# Patient Record
Sex: MALE | Race: BLACK OR AFRICAN AMERICAN | Employment: FULL TIME | URBAN - METROPOLITAN AREA
[De-identification: names, ages, dates, MRNs, and addresses within clinical notes are randomized per-mention and may not be internally consistent; named-entity substitution may affect disease eponyms.]

---

## 2017-03-18 ENCOUNTER — HOSPITAL ENCOUNTER (EMERGENCY)
Age: 27
Discharge: HOME | End: 2017-03-18
Attending: EMERGENCY MEDICINE
Payer: COMMERCIAL

## 2017-03-18 VITALS
BODY MASS INDEX: 28.12 KG/M2 | RESPIRATION RATE: 16 BRPM | TEMPERATURE: 98 F | OXYGEN SATURATION: 99 % | WEIGHT: 175 LBS | DIASTOLIC BLOOD PRESSURE: 66 MMHG | SYSTOLIC BLOOD PRESSURE: 149 MMHG | HEART RATE: 73 BPM | HEIGHT: 66 IN

## 2017-03-18 DIAGNOSIS — H57.13 EYE PAIN, BILATERAL: Primary | ICD-10-CM

## 2017-03-18 PROCEDURE — 99282 EMERGENCY DEPT VISIT SF MDM: CPT

## 2017-03-18 NOTE — DISCHARGE INSTRUCTIONS
Nico Easton is a 31 y.o. male that was discharged in stable condition. The patients diagnosis, condition and treatment were explained to  patient and aftercare instructions were given. The patient verbalized understanding. Patient armband removed and shredded.

## 2017-03-18 NOTE — ED PROVIDER NOTES
HPI Comments: 2:50 PM Nia Paula is a 31 y.o. male who presents to ED to be evaluated for HA onset one week ago . Pt describes it as \"sharp\" and \"shooting\" through his head. Pt c/o bilateral eye pain towards the back of his eyes. States it hurts to look to the side. Pt also c/o blurred vision and dizziness. Pt went to [facility] for the same symptoms and was prescribed hydrocodone with no relief. Pt is a  and welds pipes frequently. Denies any trauma or known eye infiltrates. No other concerns at this time. The history is provided by the patient. History reviewed. No pertinent past medical history. Past Surgical History:   Procedure Laterality Date    HX OTHER SURGICAL      tubes placed in ears         Family History:   Problem Relation Age of Onset    Diabetes Other     Hypertension Other        Social History     Social History    Marital status: SINGLE     Spouse name: N/A    Number of children: N/A    Years of education: N/A     Occupational History    Not on file. Social History Main Topics    Smoking status: Former Smoker     Types: Cigarettes     Quit date: 10/1/2011    Smokeless tobacco: Never Used    Alcohol use No    Drug use: No    Sexual activity: Not on file     Other Topics Concern    Not on file     Social History Narrative         ALLERGIES: Review of patient's allergies indicates no known allergies. Review of Systems   Constitutional: Negative for diaphoresis and fever. HENT: Negative for congestion. Eyes: Positive for pain (Bilateral ) and visual disturbance (blurred vision). Respiratory: Negative for cough and shortness of breath. Cardiovascular: Negative for chest pain. Gastrointestinal: Negative for abdominal pain and nausea. Genitourinary: Negative for dysuria and hematuria. Musculoskeletal: Negative for back pain. Skin: Negative for rash. Neurological: Positive for dizziness and headaches.    All other systems reviewed and are negative. Vitals:    03/18/17 1439   BP: 149/66   Pulse: 73   Resp: 16   Temp: 98 °F (36.7 °C)   SpO2: 99%   Weight: 79.4 kg (175 lb)   Height: 5' 6\" (1.676 m)            Physical Exam   Constitutional: He appears well-developed and well-nourished. Non-toxic appearance. He does not have a sickly appearance. He does not appear ill. No distress. HENT:   Head: Normocephalic and atraumatic. Mouth/Throat: Oropharynx is clear and moist. No oropharyngeal exudate. Eyes: Conjunctivae and EOM are normal. Pupils are equal, round, and reactive to light. No scleral icterus. Neck: Trachea normal and normal range of motion. Neck supple. No hepatojugular reflux and no JVD present. No tracheal deviation present. No thyromegaly present. Cardiovascular: Normal rate, regular rhythm, S1 normal, S2 normal, normal heart sounds, intact distal pulses and normal pulses. Exam reveals no gallop, no S3 and no S4. No murmur heard. Pulses:       Radial pulses are 2+ on the right side, and 2+ on the left side. Dorsalis pedis pulses are 2+ on the right side, and 2+ on the left side. Pulmonary/Chest: Effort normal and breath sounds normal. No accessory muscle usage. No respiratory distress. He has no decreased breath sounds. He has no wheezes. He has no rhonchi. He has no rales. Abdominal: Soft. Normal appearance and bowel sounds are normal. He exhibits no distension and no mass. There is no hepatosplenomegaly. There is no tenderness. There is no rigidity, no rebound, no guarding, no CVA tenderness, no tenderness at McBurney's point and negative Stroud's sign. Musculoskeletal: Normal range of motion. Strength 5/5 throughout    Lymphadenopathy:        Head (right side): No submental, no submandibular, no preauricular and no occipital adenopathy present. Head (left side): No submental, no submandibular, no preauricular and no occipital adenopathy present. He has no cervical adenopathy. Right: No supraclavicular adenopathy present. Left: No supraclavicular adenopathy present. Neurological: He is alert. He has normal strength and normal reflexes. He is not disoriented. No cranial nerve deficit or sensory deficit. Coordination and gait normal. GCS eye subscore is 4. GCS verbal subscore is 5. GCS motor subscore is 6. Grossly intact    Skin: Skin is warm, dry and intact. No rash noted. He is not diaphoretic. Psychiatric: He has a normal mood and affect. His speech is normal and behavior is normal. Judgment and thought content normal. Cognition and memory are normal.   Nursing note and vitals reviewed. MDM  Number of Diagnoses or Management Options  Eye pain, bilateral:     ED Course       Procedures      I have assessed the patient. Patient is feeling well and is advised to follow up with opthalmology. Patient was discharged in stable condition. Patient is to return to emergency department if any new or worsening condition. 3:20 PM    Disposition: Discharged     Diagnosis:   1. Eye pain, bilateral          Follow-up Information     Follow up With Details Comments Contact Info    Mary Caruso MD In 2 days Follow up [address] [place] [address] 44, am scribing for, and in the presence of Michael Hanna DO 2:51 PM, 03/18/17. Physician Attestation  I personally performed the services described in the documentation, reviewed the documentation, as recorded by the scribe in my presence, and it accurately and completely records my words and actions.     Michael Hanna DO

## 2017-04-01 ENCOUNTER — HOSPITAL ENCOUNTER (OUTPATIENT)
Dept: CT IMAGING | Age: 27
Discharge: HOME | End: 2017-04-01
Payer: COMMERCIAL

## 2017-04-01 DIAGNOSIS — G43.909 MIGRAINE: ICD-10-CM

## 2017-04-01 PROCEDURE — 70450 CT HEAD/BRAIN W/O DYE: CPT

## 2018-08-04 ENCOUNTER — HOSPITAL ENCOUNTER (EMERGENCY)
Age: 28
Discharge: HOME | End: 2018-08-04
Attending: EMERGENCY MEDICINE
Payer: COMMERCIAL

## 2018-08-04 ENCOUNTER — APPOINTMENT (OUTPATIENT)
Dept: GENERAL RADIOLOGY | Age: 28
End: 2018-08-04
Attending: PHYSICIAN ASSISTANT
Payer: COMMERCIAL

## 2018-08-04 VITALS
HEART RATE: 60 BPM | SYSTOLIC BLOOD PRESSURE: 105 MMHG | DIASTOLIC BLOOD PRESSURE: 52 MMHG | RESPIRATION RATE: 20 BRPM | TEMPERATURE: 98.6 F | OXYGEN SATURATION: 98 %

## 2018-08-04 DIAGNOSIS — K59.00 CONSTIPATION, UNSPECIFIED CONSTIPATION TYPE: ICD-10-CM

## 2018-08-04 DIAGNOSIS — J06.9 VIRAL URI WITH COUGH: Primary | ICD-10-CM

## 2018-08-04 PROCEDURE — 71046 X-RAY EXAM CHEST 2 VIEWS: CPT

## 2018-08-04 PROCEDURE — 99282 EMERGENCY DEPT VISIT SF MDM: CPT

## 2018-08-04 RX ORDER — POLYETHYLENE GLYCOL 3350 17 G/17G
17 POWDER, FOR SOLUTION ORAL DAILY
Qty: 119 G | Refills: 0 | Status: SHIPPED | OUTPATIENT
Start: 2018-08-04 | End: 2018-10-21

## 2018-08-04 NOTE — DISCHARGE INSTRUCTIONS
To prevent constipation, eat a diet high in fiber and drink lots of water. Take a daily stool softener such as Colace or Dulcolax. Narcotics and other pain medications can worsen the severity of the constipation, so avoid these medications if possible. Return to ED for any severe abdominal pain, blood in stool, profuse vomiting, or if no bowel movement after 24 hours of prescribed treatments. Drinking warm liquids, gargling with warm salt water, and throat lozenges may help with symptoms. An allergy medication combined with a decongestant (Allegra-D, Claritin-D, etc) should be taken daily. Saline nasal sprays or Net-Pots can be purchased over the counter to help reduce nasal congestion. Get rest and take a multivitamin daily to boost the immune system. Return to ED for any worsening or new symptoms such as throat swelling, high fevers, shortness of breath, vomiting, or if symptoms do not improve. Constipation: Care Instructions  Your Care Instructions    Constipation means that you have a hard time passing stools (bowel movements). People pass stools from 3 times a day to once every 3 days. What is normal for you may be different. Constipation may occur with pain in the rectum and cramping. The pain may get worse when you try to pass stools. Sometimes there are small amounts of bright red blood on toilet paper or the surface of stools. This is because of enlarged veins near the rectum (hemorrhoids). A few changes in your diet and lifestyle may help you avoid ongoing constipation. Your doctor may also prescribe medicine to help loosen your stool. Some medicines can cause constipation. These include pain medicines and antidepressants. Tell your doctor about all the medicines you take. Your doctor may want to make a medicine change to ease your symptoms. Follow-up care is a key part of your treatment and safety.  Be sure to make and go to all appointments, and call your doctor if you are having problems. It's also a good idea to know your test results and keep a list of the medicines you take. How can you care for yourself at home? · Drink plenty of fluids, enough so that your urine is light yellow or clear like water. If you have kidney, heart, or liver disease and have to limit fluids, talk with your doctor before you increase the amount of fluids you drink. · Include high-fiber foods in your diet each day. These include fruits, vegetables, beans, and whole grains. · Get at least 30 minutes of exercise on most days of the week. Walking is a good choice. You also may want to do other activities, such as running, swimming, cycling, or playing tennis or team sports. · Take a fiber supplement, such as Citrucel or Metamucil, every day. Read and follow all instructions on the label. · Schedule time each day for a bowel movement. A daily routine may help. Take your time having your bowel movement. · Support your feet with a small step stool when you sit on the toilet. This helps flex your hips and places your pelvis in a squatting position. · Your doctor may recommend an over-the-counter laxative to relieve your constipation. Examples are Milk of Magnesia and MiraLax. Read and follow all instructions on the label. Do not use laxatives on a long-term basis. When should you call for help? Call your doctor now or seek immediate medical care if:    · You have new or worse belly pain.     · You have new or worse nausea or vomiting.     · You have blood in your stools.    Watch closely for changes in your health, and be sure to contact your doctor if:    · Your constipation is getting worse.     · You do not get better as expected. Where can you learn more? Go to http://jay-isidoro.info/. Enter 21  in the search box to learn more about \"Constipation: Care Instructions. \"  Current as of: November 20, 2017  Content Version: 11.7  © 5805-4036 [employer].  Care instructions adapted under license by [employer] (which disclaims liability or warranty for this information). If you have questions about a medical condition or this instruction, always ask your healthcare professional. [address] any warranty or liability for your use of this information. Viral Respiratory Infection: Care Instructions  Your Care Instructions    Viruses are very small organisms. They grow in number after they enter your body. There are many types that cause different illnesses, such as colds and the mumps. The symptoms of a viral respiratory infection often start quickly. They include a fever, sore throat, and runny nose. You may also just not feel well. Or you may not want to eat much. Most viral respiratory infections are not serious. They usually get better with time and self-care. Antibiotics are not used to treat a viral infection. That's because antibiotics will not help cure a viral illness. In some cases, antiviral medicine can help your body fight a serious viral infection. Follow-up care is a key part of your treatment and safety. Be sure to make and go to all appointments, and call your doctor if you are having problems. It's also a good idea to know your test results and keep a list of the medicines you take. How can you care for yourself at home? · Rest as much as possible until you feel better. · Be safe with medicines. Take your medicine exactly as prescribed. Call your doctor if you think you are having a problem with your medicine. You will get more details on the specific medicine your doctor prescribes. · Take an over-the-counter pain medicine, such as acetaminophen (Tylenol), ibuprofen (Advil, Motrin), or naproxen (Aleve), as needed for pain and fever. Read and follow all instructions on the label. Do not give aspirin to anyone younger than 20. It has been linked to Reye syndrome, a serious illness.   · Drink plenty of fluids, enough so that your urine is light yellow or clear like water. Hot fluids, such as tea or soup, may help relieve congestion in your nose and throat. If you have kidney, heart, or liver disease and have to limit fluids, talk with your doctor before you increase the amount of fluids you drink. · Try to clear mucus from your lungs by breathing deeply and coughing. · Gargle with warm salt water once an hour. This can help reduce swelling and throat pain. Use 1 teaspoon of salt mixed in 1 cup of warm water. · Do not smoke or allow others to smoke around you. If you need help quitting, talk to your doctor about stop-smoking programs and medicines. These can increase your chances of quitting for good. To avoid spreading the virus  · Cough or sneeze into a tissue. Then throw the tissue away. · If you don't have a tissue, use your hand to cover your cough or sneeze. Then clean your hand. You can also cough into your sleeve. · Wash your hands often. Use soap and warm water. Wash for 15 to 20 seconds each time. · If you don't have soap and water near you, you can clean your hands with alcohol wipes or gel. When should you call for help? Call your doctor now or seek immediate medical care if:    · You have a new or higher fever.     · Your fever lasts more than 48 hours.     · You have trouble breathing.     · You have a fever with a stiff neck or a severe headache.     · You are sensitive to light.     · You feel very sleepy or confused.    Watch closely for changes in your health, and be sure to contact your doctor if:    · You do not get better as expected. Where can you learn more? Go to http://jay-isidoro.info/. Enter B877 in the search box to learn more about \"Viral Respiratory Infection: Care Instructions. \"  Current as of: December 6, 2017  Content Version: 11.7  © 2267-0296 [employer].  Care instructions adapted under license by [employer] (which disclaims liability or warranty for this information). If you have questions about a medical condition or this instruction, always ask your healthcare professional. [place] [address] any warranty or liability for your use of this information.

## 2018-08-04 NOTE — LETTER
NOTIFICATION RETURN TO WORK / SCHOOL 
 
8/4/2018 3:00 PM 
 
Mr. Jojo Wolf [address] [place] [id number] To Whom It May Concern: Jojo Wolf is currently under the care of [id number] [facility] EMERGENCY DEPT. He will return to work/school on: 8/4/2018 If there are questions or concerns please have the patient contact our office. Sincerely, STEPHAN Luna RN

## 2018-08-04 NOTE — ED TRIAGE NOTES
Pt c/o cough for 4 weeks. Has been at [facility] for same and was given antibiotics. States feels worse. Also c/o constipation and ear pain

## 2018-08-04 NOTE — ED PROVIDER NOTES
EMERGENCY DEPARTMENT HISTORY AND PHYSICAL EXAM 
 
2:26 PM 
 
 
Date: 8/4/2018 Patient Name: Leidy Albarado History of Presenting Illness Chief Complaint Patient presents with  Cough  Constipation History Provided By: Patient Chief Complaint: cough Duration: 4 Weeks Timing:  Acute Additional History (Context): Leidy Albarado is a 28 y.o. male with No significant past medical history who presents with cough x 4 weeks, intermittent. Cough is productive with clear and yellow mucous. No wheezing or SOB. He was treated 1 week ago by urgent care with a Zpak and cough meds. It has not improved since then. Now he has developed constipation as well. He has abdominal pain with bowel movements and has to strain. He denies abdominal pain currently, nausea, vomiting, or diarrhea. He confirms chest pain but only when coughing. He had an episode of ear pain this morning. No other symptoms. PCP: Hayley Bravo MD 
 
 
 
Past History Past Medical History: 
History reviewed. No pertinent past medical history. Past Surgical History: 
Past Surgical History:  
Procedure Laterality Date  HX OTHER SURGICAL    
 tubes placed in ears Family History: 
Family History Problem Relation Age of Onset  Diabetes Other  Hypertension Other Social History: 
Social History Substance Use Topics  Smoking status: Former Smoker Types: Cigarettes Quit date: 10/1/2011  Smokeless tobacco: Never Used  Alcohol use No  
 
 
Allergies: 
No Known Allergies Review of Systems Review of Systems Constitutional: Negative for fever. HENT: Positive for ear pain. Negative for facial swelling. Eyes: Negative for visual disturbance. Respiratory: Positive for cough. Negative for shortness of breath. Cardiovascular: Positive for chest pain. Gastrointestinal: Positive for abdominal pain and constipation. Negative for nausea and vomiting. Genitourinary: Negative for dysuria. Musculoskeletal: Negative for neck pain. Skin: Negative for rash. Neurological: Negative for dizziness. Psychiatric/Behavioral: Negative for confusion. All other systems reviewed and are negative. Physical Exam  
 
Visit Vitals  /52 (BP 1 Location: Left arm)  Pulse 60  Temp 98.6 °F (37 °C)  Resp 20  SpO2 98% Physical Exam  
Constitutional: He appears well-developed and well-nourished. No distress. HENT:  
Head: Normocephalic and atraumatic. Right Ear: Tympanic membrane, external ear and ear canal normal.  
Left Ear: Tympanic membrane, external ear and ear canal normal.  
Nose: Nose normal. Right sinus exhibits no maxillary sinus tenderness and no frontal sinus tenderness. Left sinus exhibits no maxillary sinus tenderness and no frontal sinus tenderness. Mouth/Throat: Uvula is midline, oropharynx is clear and moist and mucous membranes are normal. No trismus in the jaw. No uvula swelling. No oropharyngeal exudate, posterior oropharyngeal edema, posterior oropharyngeal erythema or tonsillar abscesses. Eyes: Conjunctivae are normal.  
Neck: Normal range of motion. Neck supple. Cardiovascular: Normal rate, regular rhythm and normal heart sounds. Pulmonary/Chest: Effort normal and breath sounds normal. He has no wheezes. He has no rales. Abdominal: Soft. There is no tenderness. Musculoskeletal: Normal range of motion. Neurological: He is alert. Skin: Skin is warm and dry. He is not diaphoretic. Psychiatric: He has a normal mood and affect. Nursing note and vitals reviewed. Diagnostic Study Results Labs - No results found for this or any previous visit (from the past 12 hour(s)). Radiologic Studies -  
XR CHEST PA LAT    (Results Pending) Negative Medical Decision Making I am the first provider for this patient.  
 
I reviewed the vital signs, available nursing notes, past medical history, past surgical history, family history and social history. Vital Signs-Reviewed the patient's vital signs. Records Reviewed: Nursing Notes and Previous Radiology Studies (Time of Review: 2:26 PM) 
 
ED Course: Progress Notes, Reevaluation, and Consults: XR today appears negative. This is most likely viral since he is not responding to abx. He works in an environment with fumes and dust.  Recommended that he wear a mask at work. Start allergy meds. Return if fevers occur. Discussed treatment plan, return precautions, symptomatic relief, and expected time to improvement. All questions answered. Patient is stable for discharge and outpatient management. Provider Notes (Medical Decision Making): MDM Number of Diagnoses or Management Options Diagnosis management comments: Intermittent cough x 4 weeks. Not improved with abx. Diagnosis Clinical Impression: 1. Viral URI with cough 2. Constipation, unspecified constipation type Disposition:  
 
Follow-up Information Follow up With Details Comments Contact Info Paramjit Cardoza MD In 1 week If symptoms do not improve [id number] [facility] EMERGENCY DEPT  [facility] wor[facility] [place] [id number] [phone] Patient's Medications Start Taking POLYETHYLENE GLYCOL (MIRALAX) 17 GRAM/DOSE POWDER    Take 17 g by mouth daily. 1 tablespoon with 8 oz of water daily Continue Taking No medications on file These Medications have changed No medications on file Stop Taking AMOXICILLIN-CLAVULANATE (AUGMENTIN) 500-125 MG PER TABLET    Take  by mouth two (2) times a day. CHLORPHENIRAMINE-HYDROCODONE (TUSSIONEX PENNKINETIC ER) 10-8 MG/5 ML SUSPENSION    Take  by mouth every twelve (12) hours as needed for Cough.  
 
_______________________________ Attestations: 
Scribe Attestation Vinny LI Florez PA-C acting as a scribe for and in the presence of REMBERTO/[employer], AMANDA     
August 04, 2018 at 3:02 PM 
    
Provider Attestation:     
I personally performed the services described in the documentation, reviewed the documentation, as recorded by the scribe in my presence, and it accurately and completely records my words and actions. August 04, 2018 at 3:02 PM - REMBERTO/[employer]Active[employer]AMANDA chamberlain 
_______________________________

## 2018-10-21 ENCOUNTER — HOSPITAL ENCOUNTER (EMERGENCY)
Age: 28
Discharge: HOME | End: 2018-10-21
Attending: EMERGENCY MEDICINE | Admitting: EMERGENCY MEDICINE
Payer: COMMERCIAL

## 2018-10-21 VITALS
TEMPERATURE: 99.1 F | HEART RATE: 63 BPM | RESPIRATION RATE: 18 BRPM | OXYGEN SATURATION: 100 % | HEIGHT: 66 IN | SYSTOLIC BLOOD PRESSURE: 141 MMHG | BODY MASS INDEX: 28.28 KG/M2 | DIASTOLIC BLOOD PRESSURE: 64 MMHG | WEIGHT: 176 LBS

## 2018-10-21 DIAGNOSIS — D72.819 LEUKOPENIA, UNSPECIFIED TYPE: ICD-10-CM

## 2018-10-21 DIAGNOSIS — R42 DIZZINESS: ICD-10-CM

## 2018-10-21 DIAGNOSIS — R51.9 ACUTE NONINTRACTABLE HEADACHE, UNSPECIFIED HEADACHE TYPE: ICD-10-CM

## 2018-10-21 DIAGNOSIS — H93.8X1 PRESSURE SENSATION IN RIGHT EAR: ICD-10-CM

## 2018-10-21 DIAGNOSIS — B34.9 VIRAL SYNDROME: Primary | ICD-10-CM

## 2018-10-21 LAB
ALBUMIN SERPL-MCNC: 4.1 G/DL (ref 3.4–5)
ALBUMIN/GLOB SERPL: 1 {RATIO} (ref 0.8–1.7)
ALP SERPL-CCNC: 99 U/L (ref 45–117)
ALT SERPL-CCNC: 30 U/L (ref 16–61)
ANION GAP SERPL CALC-SCNC: 6 MMOL/L (ref 3–18)
APPEARANCE UR: CLEAR
AST SERPL-CCNC: 22 U/L (ref 15–37)
BASOPHILS # BLD: 0 K/UL (ref 0–0.1)
BASOPHILS NFR BLD: 1 % (ref 0–2)
BILIRUB SERPL-MCNC: 0.5 MG/DL (ref 0.2–1)
BILIRUB UR QL: NEGATIVE
BUN SERPL-MCNC: 11 MG/DL (ref 7–18)
BUN/CREAT SERPL: 9 (ref 12–20)
CALCIUM SERPL-MCNC: 9.2 MG/DL (ref 8.5–10.1)
CHLORIDE SERPL-SCNC: 103 MMOL/L (ref 100–108)
CO2 SERPL-SCNC: 29 MMOL/L (ref 21–32)
COLOR UR: YELLOW
CREAT SERPL-MCNC: 1.26 MG/DL (ref 0.6–1.3)
DIFFERENTIAL METHOD BLD: ABNORMAL
EOSINOPHIL # BLD: 0 K/UL (ref 0–0.4)
EOSINOPHIL NFR BLD: 1 % (ref 0–5)
ERYTHROCYTE [DISTWIDTH] IN BLOOD BY AUTOMATED COUNT: 11.4 % (ref 11.6–14.5)
GLOBULIN SER CALC-MCNC: 4.1 G/DL (ref 2–4)
GLUCOSE SERPL-MCNC: 113 MG/DL (ref 74–99)
GLUCOSE UR STRIP.AUTO-MCNC: NEGATIVE MG/DL
HCT VFR BLD AUTO: 44.5 % (ref 36–48)
HETEROPH AB SER QL: NEGATIVE
HGB BLD-MCNC: 15.1 G/DL (ref 13–16)
HGB UR QL STRIP: NEGATIVE
KETONES UR QL STRIP.AUTO: NEGATIVE MG/DL
LEUKOCYTE ESTERASE UR QL STRIP.AUTO: NEGATIVE
LYMPHOCYTES # BLD: 1 K/UL (ref 0.9–3.6)
LYMPHOCYTES NFR BLD: 38 % (ref 21–52)
MAGNESIUM SERPL-MCNC: 2 MG/DL (ref 1.6–2.6)
MCH RBC QN AUTO: 31.9 PG (ref 24–34)
MCHC RBC AUTO-ENTMCNC: 33.9 G/DL (ref 31–37)
MCV RBC AUTO: 93.9 FL (ref 74–97)
MONOCYTES # BLD: 0.2 K/UL (ref 0.05–1.2)
MONOCYTES NFR BLD: 8 % (ref 3–10)
NEUTS SEG # BLD: 1.4 K/UL (ref 1.8–8)
NEUTS SEG NFR BLD: 52 % (ref 40–73)
NITRITE UR QL STRIP.AUTO: NEGATIVE
PH UR STRIP: 7.5 [PH] (ref 5–8)
PLATELET # BLD AUTO: 272 K/UL (ref 135–420)
PMV BLD AUTO: 9.7 FL (ref 9.2–11.8)
POTASSIUM SERPL-SCNC: 4.5 MMOL/L (ref 3.5–5.5)
PROT SERPL-MCNC: 8.2 G/DL (ref 6.4–8.2)
PROT UR STRIP-MCNC: NEGATIVE MG/DL
RBC # BLD AUTO: 4.74 M/UL (ref 4.7–5.5)
SODIUM SERPL-SCNC: 138 MMOL/L (ref 136–145)
SP GR UR REFRACTOMETRY: 1.02 (ref 1–1.03)
UROBILINOGEN UR QL STRIP.AUTO: 1 EU/DL (ref 0.2–1)
WBC # BLD AUTO: 2.6 K/UL (ref 4.6–13.2)

## 2018-10-21 PROCEDURE — 81003 URINALYSIS AUTO W/O SCOPE: CPT | Performed by: PHYSICIAN ASSISTANT

## 2018-10-21 PROCEDURE — 80053 COMPREHEN METABOLIC PANEL: CPT | Performed by: PHYSICIAN ASSISTANT

## 2018-10-21 PROCEDURE — 99282 EMERGENCY DEPT VISIT SF MDM: CPT

## 2018-10-21 PROCEDURE — 83735 ASSAY OF MAGNESIUM: CPT | Performed by: PHYSICIAN ASSISTANT

## 2018-10-21 PROCEDURE — 74011250636 HC RX REV CODE- 250/636: Performed by: PHYSICIAN ASSISTANT

## 2018-10-21 PROCEDURE — 86308 HETEROPHILE ANTIBODY SCREEN: CPT | Performed by: PHYSICIAN ASSISTANT

## 2018-10-21 PROCEDURE — 74011250637 HC RX REV CODE- 250/637: Performed by: PHYSICIAN ASSISTANT

## 2018-10-21 PROCEDURE — 87491 CHLMYD TRACH DNA AMP PROBE: CPT | Performed by: PHYSICIAN ASSISTANT

## 2018-10-21 PROCEDURE — 87081 CULTURE SCREEN ONLY: CPT | Performed by: PHYSICIAN ASSISTANT

## 2018-10-21 PROCEDURE — 85025 COMPLETE CBC W/AUTO DIFF WBC: CPT | Performed by: PHYSICIAN ASSISTANT

## 2018-10-21 RX ORDER — IBUPROFEN 800 MG/1
800 TABLET ORAL
Qty: 20 TAB | Refills: 0 | Status: SHIPPED | OUTPATIENT
Start: 2018-10-21 | End: 2018-10-28

## 2018-10-21 RX ORDER — ONDANSETRON 4 MG/1
4 TABLET, ORALLY DISINTEGRATING ORAL
Status: COMPLETED | OUTPATIENT
Start: 2018-10-21 | End: 2018-10-21

## 2018-10-21 RX ORDER — TOPIRAMATE 50 MG/1
50 TABLET, FILM COATED ORAL
COMMUNITY
End: 2019-06-20

## 2018-10-21 RX ORDER — FLUTICASONE PROPIONATE 50 MCG
2 SPRAY, SUSPENSION (ML) NASAL DAILY
Qty: 1 BOTTLE | Refills: 0 | Status: SHIPPED | OUTPATIENT
Start: 2018-10-21

## 2018-10-21 RX ORDER — BUTALBITAL, ACETAMINOPHEN AND CAFFEINE 50; 325; 40 MG/1; MG/1; MG/1
1 TABLET ORAL
COMMUNITY
End: 2019-09-12 | Stop reason: ALTCHOICE

## 2018-10-21 RX ORDER — MECLIZINE HCL 12.5 MG 12.5 MG/1
25 TABLET ORAL
Status: COMPLETED | OUTPATIENT
Start: 2018-10-21 | End: 2018-10-21

## 2018-10-21 RX ORDER — MECLIZINE HYDROCHLORIDE 25 MG/1
25 TABLET ORAL
Qty: 20 TAB | Refills: 0 | Status: SHIPPED | OUTPATIENT
Start: 2018-10-21 | End: 2019-05-25

## 2018-10-21 RX ADMIN — MECLIZINE 25 MG: 12.5 TABLET ORAL at 14:01

## 2018-10-21 RX ADMIN — ONDANSETRON 4 MG: 4 TABLET, ORALLY DISINTEGRATING ORAL at 14:01

## 2018-10-21 NOTE — LETTER
NOTIFICATION OF RETURN TO WORK / SCHOOL 
10/21/2018 Mr. Ashly Aguillon [address] [place], [place] [id number] To Whom It May Concern: Ashly Aguillon was seen in the ED on 10/21/18 and may be excused from work for 2 days. Sincerely, Zuleima Orta PA-C

## 2018-10-21 NOTE — ED PROVIDER NOTES
EMERGENCY DEPARTMENT HISTORY AND PHYSICAL EXAM 
 
Date: 10/21/2018 Patient Name: Dannis Sandifer History of Presenting Illness Chief Complaint Patient presents with  Ear Pain  Headache  Urinary Frequency History Provided By: patient Chief Complaint: ear pain Duration:1 week Timing:  acute Location: R ear Quality:pressure like Severity: moderate Modifying Factors: sudafed hasn't helped Associated Symptoms: dizziness, headache, nausea Additional History (Context): Dannis Sandifer is a 33 y.o. male with PMH migraines who presents with complaints of R ear pressure, headaches, nausea, and dizziness x 1 week. Pt seen at urgent care and told to take sudafed, but denies relief in sx. Denies fever, chills, and neck pain. No other complaints. PCP: Lucy Angela MD 
 
Current Outpatient Medications Medication Sig Dispense Refill  butalbital-acetaminophen-caffeine (FIORICET, ESGIC) -40 mg per tablet Take 1 Tab by mouth.  topiramate (TOPAMAX) 50 mg tablet Take 50 mg by mouth nightly.  ibuprofen (MOTRIN) 800 mg tablet Take 1 Tab by mouth every six (6) hours as needed for Pain for up to 7 days. 20 Tab 0  
 meclizine (ANTIVERT) 25 mg tablet Take 1 Tab by mouth three (3) times daily as needed. 20 Tab 0  
 fluticasone (FLONASE) 50 mcg/actuation nasal spray 2 Sprays by Both Nostrils route daily. 1 Bottle 0 Past History Past Medical History: 
Past Medical History:  
Diagnosis Date  Migraine Past Surgical History: 
Past Surgical History:  
Procedure Laterality Date  HX OTHER SURGICAL    
 tubes placed in ears Family History: 
Family History Problem Relation Age of Onset  Diabetes Other  Hypertension Other Social History: 
Social History Tobacco Use  Smoking status: Former Smoker Types: Cigarettes Last attempt to quit: 10/1/2011 Years since quittin.0  Smokeless tobacco: Never Used Substance Use Topics  Alcohol use: No  
 Drug use: No  
 
 
Allergies: 
No Known Allergies Review of Systems Review of Systems Constitutional: Negative. Negative for chills and fever. HENT: Positive for ear pain. Negative for congestion and rhinorrhea. Eyes: Negative. Negative for pain and redness. Respiratory: Negative. Negative for cough, shortness of breath, wheezing and stridor. Cardiovascular: Negative. Negative for chest pain and leg swelling. Gastrointestinal: Positive for nausea. Negative for abdominal pain, constipation, diarrhea and vomiting. Genitourinary: Negative. Negative for dysuria and frequency. Musculoskeletal: Negative. Negative for back pain and neck pain. Skin: Negative. Negative for rash and wound. Neurological: Positive for dizziness and headaches. Negative for seizures and syncope. All other systems reviewed and are negative. All Other Systems Negative Physical Exam  
 
Vitals:  
 10/21/18 1222 BP: 141/64 Pulse: 63 Resp: 18 Temp: 99.1 °F (37.3 °C) SpO2: 100% Weight: 79.8 kg (176 lb) Height: 5' 6\" (1.676 m) Physical Exam  
Constitutional: He is oriented to person, place, and time. He appears well-developed and well-nourished. No distress. HENT:  
Head: Normocephalic and atraumatic. Eyes: Conjunctivae and EOM are normal. Pupils are equal, round, and reactive to light. Right eye exhibits no discharge. Left eye exhibits no discharge. No scleral icterus. Neck: Normal range of motion. Neck supple. No nuchal rigidity or meningeal signs noted Cardiovascular: Normal rate, regular rhythm and normal heart sounds. Exam reveals no gallop and no friction rub. No murmur heard. Pulmonary/Chest: Effort normal and breath sounds normal. No stridor. No respiratory distress. He has no wheezes. He has no rales. Musculoskeletal: Normal range of motion. Neurological: He is alert and oriented to person, place, and time.  No cranial nerve deficit. He exhibits normal muscle tone. Coordination normal.  
Gait is steady. Able to ambulate without difficulty. Skin: Skin is warm and dry. No rash noted. He is not diaphoretic. No erythema. Psychiatric: He has a normal mood and affect. His behavior is normal. Thought content normal.  
Nursing note and vitals reviewed. Diagnostic Study Results Labs - Recent Results (from the past 12 hour(s)) URINALYSIS W/ RFLX MICROSCOPIC Collection Time: 10/21/18 12:25 PM  
Result Value Ref Range Color YELLOW Appearance CLEAR Specific gravity 1.016 1.005 - 1.030    
 pH (UA) 7.5 5.0 - 8.0 Protein NEGATIVE  NEG mg/dL Glucose NEGATIVE  NEG mg/dL Ketone NEGATIVE  NEG mg/dL Bilirubin NEGATIVE  NEG Blood NEGATIVE  NEG Urobilinogen 1.0 0.2 - 1.0 EU/dL Nitrites NEGATIVE  NEG Leukocyte Esterase NEGATIVE  NEG MONONUCLEOSIS SCREEN Collection Time: 10/21/18 12:36 PM  
Result Value Ref Range Mononucleosis screen NEGATIVE     
CBC WITH AUTOMATED DIFF Collection Time: 10/21/18 12:36 PM  
Result Value Ref Range WBC 2.6 (L) 4.6 - 13.2 K/uL  
 RBC 4.74 4.70 - 5.50 M/uL  
 HGB 15.1 13.0 - 16.0 g/dL HCT 44.5 36.0 - 48.0 % MCV 93.9 74.0 - 97.0 FL  
 MCH 31.9 24.0 - 34.0 PG  
 MCHC 33.9 31.0 - 37.0 g/dL  
 RDW 11.4 (L) 11.6 - 14.5 % PLATELET 435 509 - 858 K/uL MPV 9.7 9.2 - 11.8 FL  
 NEUTROPHILS 52 40 - 73 % LYMPHOCYTES 38 21 - 52 % MONOCYTES 8 3 - 10 % EOSINOPHILS 1 0 - 5 % BASOPHILS 1 0 - 2 %  
 ABS. NEUTROPHILS 1.4 (L) 1.8 - 8.0 K/UL  
 ABS. LYMPHOCYTES 1.0 0.9 - 3.6 K/UL  
 ABS. MONOCYTES 0.2 0.05 - 1.2 K/UL  
 ABS. EOSINOPHILS 0.0 0.0 - 0.4 K/UL  
 ABS. BASOPHILS 0.0 0.0 - 0.1 K/UL  
 DF AUTOMATED METABOLIC PANEL, COMPREHENSIVE Collection Time: 10/21/18 12:36 PM  
Result Value Ref Range Sodium 138 136 - 145 mmol/L Potassium 4.5 3.5 - 5.5 mmol/L  Chloride 103 100 - 108 mmol/L  
 CO2 29 21 - 32 mmol/L  
 Anion gap 6 3.0 - 18 mmol/L Glucose 113 (H) 74 - 99 mg/dL BUN 11 7.0 - 18 MG/DL Creatinine 1.26 0.6 - 1.3 MG/DL  
 BUN/Creatinine ratio 9 (L) 12 - 20 GFR est AA >60 >60 ml/min/1.73m2 GFR est non-AA >60 >60 ml/min/1.73m2 Calcium 9.2 8.5 - 10.1 MG/DL Bilirubin, total 0.5 0.2 - 1.0 MG/DL  
 ALT (SGPT) 30 16 - 61 U/L  
 AST (SGOT) 22 15 - 37 U/L Alk. phosphatase 99 45 - 117 U/L Protein, total 8.2 6.4 - 8.2 g/dL Albumin 4.1 3.4 - 5.0 g/dL Globulin 4.1 (H) 2.0 - 4.0 g/dL A-G Ratio 1.0 0.8 - 1.7 MAGNESIUM Collection Time: 10/21/18 12:36 PM  
Result Value Ref Range Magnesium 2.0 1.6 - 2.6 mg/dL STREP THROAT SCREEN Collection Time: 10/21/18  1:44 PM  
Result Value Ref Range Special Requests: NO SPECIAL REQUESTS Strep Screen NEGATIVE Culture result: PENDING Radiologic Studies - No orders to display CT Results  (Last 48 hours) None CXR Results  (Last 48 hours) None Medical Decision Making I am the first provider for this patient. I reviewed the vital signs, available nursing notes, past medical history, past surgical history, family history and social history. Vital Signs-Reviewed the patient's vital signs. Records Reviewed: Zuleima Orta PA-C 4:41 PM  
 
Procedures: 
Procedures Provider Notes (Medical Decision Making): Impression:  Viral syndrome, headache, leukopenia Meclizine and zofran given, sx improving MED RECONCILIATION: 
No current facility-administered medications for this encounter. Current Outpatient Medications Medication Sig  butalbital-acetaminophen-caffeine (FIORICET, ESGIC) -40 mg per tablet Take 1 Tab by mouth.  topiramate (TOPAMAX) 50 mg tablet Take 50 mg by mouth nightly.  ibuprofen (MOTRIN) 800 mg tablet Take 1 Tab by mouth every six (6) hours as needed for Pain for up to 7 days.  meclizine (ANTIVERT) 25 mg tablet Take 1 Tab by mouth three (3) times daily as needed.  fluticasone (FLONASE) 50 mcg/actuation nasal spray 2 Sprays by Both Nostrils route daily. Disposition: D/c 
 
DISCHARGE NOTE:  
 
Pt has been reexamined. Patient has no new complaints, changes, or physical findings. Care plan outlined and precautions discussed. Results of the labs were reviewed with the patient. All medications were reviewed with the patient; will d/c home with motrin, meclizine, and . Flonase. All of pt's questions and concerns were addressed. Patient was instructed and agrees to follow up with PCP, as well as to return to the ED upon further deterioration. Patient is ready to go home. April Chon Egan PA-C 4:45 PM  
 
Follow-up Information Follow up With Specialties Details Why Contact Info Noah Stock MD Family [facility] Schedule an appointment as soon as possible for a visit in 1 day  [address] [facility] [address] 
[phone] [address] [facility]  Schedule an appointment as soon as possible for a visit As needed [address] [address] 
[phone] [address] [place] EMERGENCY DEPT Emergency Medicine  As needed, If symptoms worsen [address] [place] [id number] [phone] Current Discharge Medication List  
  
START taking these medications Details  
ibuprofen (MOTRIN) 800 mg tablet Take 1 Tab by mouth every six (6) hours as needed for Pain for up to 7 days. Qty: 20 Tab, Refills: 0  
  
meclizine (ANTIVERT) 25 mg tablet Take 1 Tab by mouth three (3) times daily as needed. Qty: 20 Tab, Refills: 0  
  
fluticasone (FLONASE) 50 mcg/actuation nasal spray 2 Sprays by Both Nostrils route daily. Qty: 1 Bottle, Refills: 0 CONTINUE these medications which have NOT CHANGED Details  
butalbital-acetaminophen-caffeine (FIORICET, ESGIC) -40 mg per tablet Take 1 Tab by mouth. topiramate (TOPAMAX) 50 mg tablet Take 50 mg by mouth nightly. Diagnosis Clinical Impression: 1. Viral syndrome 2. Leukopenia, unspecified type 3. Acute nonintractable headache, unspecified headache type 4. Pressure sensation in right ear 5. Dizziness

## 2018-10-21 NOTE — ED NOTES
I have reviewed discharge instructions with the patient. The patient verbalized understanding. Current Discharge Medication List  
  
START taking these medications Details  
ibuprofen (MOTRIN) 800 mg tablet Take 1 Tab by mouth every six (6) hours as needed for Pain for up to 7 days. Qty: 20 Tab, Refills: 0  
  
meclizine (ANTIVERT) 25 mg tablet Take 1 Tab by mouth three (3) times daily as needed. Qty: 20 Tab, Refills: 0  
  
fluticasone (FLONASE) 50 mcg/actuation nasal spray 2 Sprays by Both Nostrils route daily. Qty: 1 Bottle, Refills: 0 CONTINUE these medications which have NOT CHANGED Details  
butalbital-acetaminophen-caffeine (FIORICET, ESGIC) -40 mg per tablet Take 1 Tab by mouth. topiramate (TOPAMAX) 50 mg tablet Take 50 mg by mouth nightly. Patient armband removed and shredded

## 2018-10-21 NOTE — DISCHARGE INSTRUCTIONS
Dizziness: Care Instructions  Your Care Instructions  Dizziness is the feeling of unsteadiness or fuzziness in your head. It is different than having vertigo, which is a feeling that the room is spinning or that you are moving or falling. It is also different from lightheadedness, which is the feeling that you are about to faint. It can be hard to know what causes dizziness. Some people feel dizzy when they have migraine headaches. Sometimes bouts of flu can make you feel dizzy. Some medical conditions, such as heart problems or high blood pressure, can make you feel dizzy. Many medicines can cause dizziness, including medicines for high blood pressure, pain, or anxiety. If a medicine causes your symptoms, your doctor may recommend that you stop or change the medicine. If it is a problem with your heart, you may need medicine to help your heart work better. If there is no clear reason for your symptoms, your doctor may suggest watching and waiting for a while to see if the dizziness goes away on its own. Follow-up care is a key part of your treatment and safety. Be sure to make and go to all appointments, and call your doctor if you are having problems. It's also a good idea to know your test results and keep a list of the medicines you take. How can you care for yourself at home? · If your doctor recommends or prescribes medicine, take it exactly as directed. Call your doctor if you think you are having a problem with your medicine. · Do not drive while you feel dizzy. · Try to prevent falls. Steps you can take include:  ? Using nonskid mats, adding grab bars near the tub, and using night-lights. ? Clearing your home so that walkways are free of anything you might trip on.  ? Letting family and friends know that you have been feeling dizzy. This will help them know how to help you. When should you call for help? Call 911 anytime you think you may need emergency care.  For example, call if:    · You passed out (lost consciousness).     · You have dizziness along with symptoms of a heart attack. These may include:  ? Chest pain or pressure, or a strange feeling in the chest.  ? Sweating. ? Shortness of breath. ? Nausea or vomiting. ? Pain, pressure, or a strange feeling in the back, neck, jaw, or upper belly or in one or both shoulders or arms. ? Lightheadedness or sudden weakness. ? A fast or irregular heartbeat.     · You have symptoms of a stroke. These may include:  ? Sudden numbness, tingling, weakness, or loss of movement in your face, arm, or leg, especially on only one side of your body. ? Sudden vision changes. ? Sudden trouble speaking. ? Sudden confusion or trouble understanding simple statements. ? Sudden problems with walking or balance. ? A sudden, severe headache that is different from past headaches.    Call your doctor now or seek immediate medical care if:    · You feel dizzy and have a fever, headache, or ringing in your ears.     · You have new or increased nausea and vomiting.     · Your dizziness does not go away or comes back.    Watch closely for changes in your health, and be sure to contact your doctor if:    · You do not get better as expected. Where can you learn more? Go to http://jay-isidoro.info/. Enter L030 in the search box to learn more about \"Dizziness: Care Instructions. \"  Current as of: November 20, 2017  Content Version: 11.8  © 8672-9477 [employer] (formerly [employer]). Care instructions adapted under license by [employer] (which disclaims liability or warranty for this information). If you have questions about a medical condition or this instruction, always ask your healthcare professional. [address] any warranty or liability for your use of this information. [employer]    Thank you for requesting access to [employer].  Please follow the instructions below to securely access and download your online medical record. [employer] allows you to send messages to your doctor, view your test results, renew your prescriptions, schedule appointments, and more. How Do I Sign Up? 1. In your internet browser, go to [email or web address]  2. Click on the First Time User? Click Here link in the Sign In box. You will be redirect to the New Member Sign Up page. 3. Enter your Razmir Access Code exactly as it appears below. You will not need to use this code after youve completed the sign-up process. If you do not sign up before the expiration date, you must request a new code. Razmir Access Code: [email or web address]  Expires: 2018  2:21 PM (This is the date your Razmir access code will )    4. Enter the last four digits of your Social Security Number (xxxx) and Date of Birth (mm/dd/yyyy) as indicated and click Submit. You will be taken to the next sign-up page. 5. Create a Razmir ID. This will be your Razmir login ID and cannot be changed, so think of one that is secure and easy to remember. 6. Create a Razmir password. You can change your password at any time. 7. Enter your Password Reset Question and Answer. This can be used at a later time if you forget your password. 8. Enter your e-mail address. You will receive e-mail notification when new information is available in [address] 9. Click Sign Up. You can now view and download portions of your medical record. 10. Click the Download Summary menu link to download a portable copy of your medical information. Additional Information    If you have questions, please visit the Frequently Asked Questions section of the [employer]mir website at [email or web address] Remember, [employer]mir is NOT to be used for urgent needs. For medical emergencies, dial 911.

## 2018-10-21 NOTE — ED TRIAGE NOTES
Patient c/o being dx with fluid on ear drum on Thursday. Patient states continued dizziness and urinary frequency.

## 2018-10-23 LAB
B-HEM STREP THROAT QL CULT: NEGATIVE
BACTERIA SPEC CULT: NORMAL
SERVICE CMNT-IMP: NORMAL

## 2018-10-24 LAB
C TRACH RRNA VAG QL NAA+PROBE: NEGATIVE
N GONORRHOEA RRNA VAG QL NAA+PROBE: NEGATIVE
SPECIMEN SOURCE: NORMAL
T VAGINALIS RRNA VAG QL NAA+PROBE: NEGATIVE

## 2018-12-01 ENCOUNTER — HOSPITAL ENCOUNTER (OUTPATIENT)
Dept: CT IMAGING | Age: 28
Discharge: HOME | End: 2018-12-01
Payer: COMMERCIAL

## 2018-12-01 DIAGNOSIS — J32.9 SINUSITIS: ICD-10-CM

## 2018-12-01 PROCEDURE — 70486 CT MAXILLOFACIAL W/O DYE: CPT

## 2019-01-10 ENCOUNTER — TELEPHONE (OUTPATIENT)
Dept: CARDIOLOGY CLINIC | Age: 29
End: 2019-01-10

## 2019-01-10 NOTE — TELEPHONE ENCOUNTER
LVM for patient to contact office to get NP appointment scheduled with Dr. Ernesto Robins. He is being referred to our office by Dr. Rachele Jones. Appointment request and office note scanned into [facility]. Letter also sent.

## 2019-05-25 ENCOUNTER — APPOINTMENT (OUTPATIENT)
Dept: GENERAL RADIOLOGY | Age: 29
End: 2019-05-25
Attending: PHYSICIAN ASSISTANT
Payer: COMMERCIAL

## 2019-05-25 ENCOUNTER — HOSPITAL ENCOUNTER (EMERGENCY)
Age: 29
Discharge: HOME | End: 2019-05-25
Attending: EMERGENCY MEDICINE
Payer: COMMERCIAL

## 2019-05-25 VITALS
TEMPERATURE: 98.7 F | RESPIRATION RATE: 20 BRPM | WEIGHT: 180 LBS | HEIGHT: 66 IN | HEART RATE: 61 BPM | OXYGEN SATURATION: 100 % | DIASTOLIC BLOOD PRESSURE: 52 MMHG | BODY MASS INDEX: 28.93 KG/M2 | SYSTOLIC BLOOD PRESSURE: 126 MMHG

## 2019-05-25 DIAGNOSIS — R51.9 ACUTE NONINTRACTABLE HEADACHE, UNSPECIFIED HEADACHE TYPE: ICD-10-CM

## 2019-05-25 DIAGNOSIS — R07.89 ATYPICAL CHEST PAIN: Primary | ICD-10-CM

## 2019-05-25 LAB
ALBUMIN SERPL-MCNC: 3.9 G/DL (ref 3.4–5)
ALBUMIN/GLOB SERPL: 1 {RATIO} (ref 0.8–1.7)
ALP SERPL-CCNC: 92 U/L (ref 45–117)
ALT SERPL-CCNC: 27 U/L (ref 16–61)
AMPHET UR QL SCN: NEGATIVE
ANION GAP SERPL CALC-SCNC: 8 MMOL/L (ref 3–18)
APPEARANCE UR: CLEAR
AST SERPL-CCNC: 23 U/L (ref 15–37)
BARBITURATES UR QL SCN: NEGATIVE
BASOPHILS # BLD: 0 K/UL (ref 0–0.1)
BASOPHILS NFR BLD: 0 % (ref 0–2)
BENZODIAZ UR QL: NEGATIVE
BILIRUB SERPL-MCNC: 0.4 MG/DL (ref 0.2–1)
BILIRUB UR QL: NEGATIVE
BUN SERPL-MCNC: 13 MG/DL (ref 7–18)
BUN/CREAT SERPL: 12 (ref 12–20)
CALCIUM SERPL-MCNC: 9 MG/DL (ref 8.5–10.1)
CANNABINOIDS UR QL SCN: NEGATIVE
CHLORIDE SERPL-SCNC: 104 MMOL/L (ref 100–108)
CK MB CFR SERPL CALC: 0.9 % (ref 0–4)
CK MB SERPL-MCNC: 3.2 NG/ML (ref 5–25)
CK SERPL-CCNC: 367 U/L (ref 39–308)
CO2 SERPL-SCNC: 28 MMOL/L (ref 21–32)
COCAINE UR QL SCN: NEGATIVE
COLOR UR: YELLOW
CREAT SERPL-MCNC: 1.1 MG/DL (ref 0.6–1.3)
DIFFERENTIAL METHOD BLD: ABNORMAL
EOSINOPHIL # BLD: 0.1 K/UL (ref 0–0.4)
EOSINOPHIL NFR BLD: 1 % (ref 0–5)
ERYTHROCYTE [DISTWIDTH] IN BLOOD BY AUTOMATED COUNT: 11.6 % (ref 11.6–14.5)
GLOBULIN SER CALC-MCNC: 3.8 G/DL (ref 2–4)
GLUCOSE SERPL-MCNC: 78 MG/DL (ref 74–99)
GLUCOSE UR STRIP.AUTO-MCNC: NEGATIVE MG/DL
HCT VFR BLD AUTO: 42.1 % (ref 36–48)
HDSCOM,HDSCOM: NORMAL
HETEROPH AB SER QL: NEGATIVE
HGB BLD-MCNC: 14.6 G/DL (ref 13–16)
HGB UR QL STRIP: NEGATIVE
KETONES UR QL STRIP.AUTO: NEGATIVE MG/DL
LEUKOCYTE ESTERASE UR QL STRIP.AUTO: NEGATIVE
LYMPHOCYTES # BLD: 1.5 K/UL (ref 0.9–3.6)
LYMPHOCYTES NFR BLD: 39 % (ref 21–52)
MAGNESIUM SERPL-MCNC: 2.5 MG/DL (ref 1.6–2.6)
MCH RBC QN AUTO: 32.2 PG (ref 24–34)
MCHC RBC AUTO-ENTMCNC: 34.7 G/DL (ref 31–37)
MCV RBC AUTO: 92.9 FL (ref 74–97)
METHADONE UR QL: NEGATIVE
MONOCYTES # BLD: 0.4 K/UL (ref 0.05–1.2)
MONOCYTES NFR BLD: 9 % (ref 3–10)
NEUTS SEG # BLD: 2 K/UL (ref 1.8–8)
NEUTS SEG NFR BLD: 51 % (ref 40–73)
NITRITE UR QL STRIP.AUTO: NEGATIVE
OPIATES UR QL: NEGATIVE
PCP UR QL: NEGATIVE
PH UR STRIP: 6 [PH] (ref 5–8)
PLATELET # BLD AUTO: 227 K/UL (ref 135–420)
PMV BLD AUTO: 9 FL (ref 9.2–11.8)
POTASSIUM SERPL-SCNC: 3.9 MMOL/L (ref 3.5–5.5)
PROT SERPL-MCNC: 7.7 G/DL (ref 6.4–8.2)
PROT UR STRIP-MCNC: NEGATIVE MG/DL
RBC # BLD AUTO: 4.53 M/UL (ref 4.7–5.5)
SODIUM SERPL-SCNC: 140 MMOL/L (ref 136–145)
SP GR UR REFRACTOMETRY: 1.02 (ref 1–1.03)
TROPONIN I SERPL-MCNC: <0.02 NG/ML (ref 0–0.04)
UROBILINOGEN UR QL STRIP.AUTO: 1 EU/DL (ref 0.2–1)
WBC # BLD AUTO: 3.9 K/UL (ref 4.6–13.2)

## 2019-05-25 PROCEDURE — 99283 EMERGENCY DEPT VISIT LOW MDM: CPT

## 2019-05-25 PROCEDURE — 82550 ASSAY OF CK (CPK): CPT

## 2019-05-25 PROCEDURE — 85025 COMPLETE CBC W/AUTO DIFF WBC: CPT

## 2019-05-25 PROCEDURE — 86308 HETEROPHILE ANTIBODY SCREEN: CPT

## 2019-05-25 PROCEDURE — 83735 ASSAY OF MAGNESIUM: CPT

## 2019-05-25 PROCEDURE — 80053 COMPREHEN METABOLIC PANEL: CPT

## 2019-05-25 PROCEDURE — 80307 DRUG TEST PRSMV CHEM ANLYZR: CPT

## 2019-05-25 PROCEDURE — 81003 URINALYSIS AUTO W/O SCOPE: CPT

## 2019-05-25 PROCEDURE — 96374 THER/PROPH/DIAG INJ IV PUSH: CPT

## 2019-05-25 PROCEDURE — 71046 X-RAY EXAM CHEST 2 VIEWS: CPT

## 2019-05-25 PROCEDURE — 74011250636 HC RX REV CODE- 250/636: Performed by: PHYSICIAN ASSISTANT

## 2019-05-25 PROCEDURE — 96361 HYDRATE IV INFUSION ADD-ON: CPT

## 2019-05-25 PROCEDURE — 93005 ELECTROCARDIOGRAM TRACING: CPT

## 2019-05-25 RX ORDER — KETOROLAC TROMETHAMINE 30 MG/ML
30 INJECTION, SOLUTION INTRAMUSCULAR; INTRAVENOUS
Status: COMPLETED | OUTPATIENT
Start: 2019-05-25 | End: 2019-05-25

## 2019-05-25 RX ORDER — BUTALBITAL, ACETAMINOPHEN AND CAFFEINE 300; 40; 50 MG/1; MG/1; MG/1
1 CAPSULE ORAL
Qty: 12 CAP | Refills: 0 | Status: SHIPPED | OUTPATIENT
Start: 2019-05-25 | End: 2019-09-12

## 2019-05-25 RX ADMIN — KETOROLAC TROMETHAMINE 30 MG: 30 INJECTION, SOLUTION INTRAMUSCULAR; INTRAVENOUS at 17:21

## 2019-05-25 RX ADMIN — SODIUM CHLORIDE 1000 ML: 900 INJECTION, SOLUTION INTRAVENOUS at 16:30

## 2019-05-25 NOTE — ED PROVIDER NOTES
EMERGENCY DEPARTMENT HISTORY AND PHYSICAL EXAM    4:19 PM      Date: 5/25/2019  Patient Name: Hang Snider    History of Presenting Illness     Chief Complaint   Patient presents with    Chest Pain    Headache       History Provided By: Patient    Chief Complaint: chest pain, headache  Duration: 2 Weeks  Timing:  Acute  Location:   Quality: Aching  Severity: 5 out of 10  Modifying Factors: none  Associated Symptoms: denies any other associated signs or symptoms      Additional History (Context):Benji Gallegos is a 33 y.o. male with a pertinent history of migraine who presents to the emergency department for evaluation of substernal, nonradiating chest pain x2 weeks. Patient denies any recent injury, trauma, change in activity. He admits to occasional nonproductive cough and nasal congestion he denies any personal history of heart disease. No family history of heart disease at an early age or sudden unexplained death. Patient also complaining of intermittent headaches for the past week. He states he has been diagnosed with migraines, but is not sure he is actually having migraines. Patient denies any nausea, vomiting, dizziness, diarrhea, abdominal pain, urinary symptoms, leg swelling, shortness of breath, or any other complaints. PCP:  Wilber Figueroa MD        Current Facility-Administered Medications   Medication Dose Route Frequency Provider Last Rate Last Dose    sodium chloride 0.9 % bolus infusion 1,000 mL  1,000 mL IntraVENous ONCE Olga Loja PA-C 1,000 mL/hr at 05/25/19 1630 1,000 mL at 05/25/19 1630    ketorolac (TORADOL) injection 30 mg  30 mg IntraVENous NOW Olga Loja PA-C         Current Outpatient Medications   Medication Sig Dispense Refill    butalbital-acetaminophen-caff (FIORICET) -40 mg per capsule Take 1 Cap by mouth every four (4) hours as needed for Pain.  12 Cap 0    butalbital-acetaminophen-caffeine (FIORICET, ESGIC) -40 mg per tablet Take 1 Tab by mouth.  fluticasone (FLONASE) 50 mcg/actuation nasal spray 2 Sprays by Both Nostrils route daily. 1 Bottle 0    topiramate (TOPAMAX) 50 mg tablet Take 50 mg by mouth nightly. Past History     Past Medical History:  Past Medical History:   Diagnosis Date    Migraine        Past Surgical History:  Past Surgical History:   Procedure Laterality Date    HX OTHER SURGICAL      tubes placed in ears       Family History:  Family History   Problem Relation Age of Onset    Diabetes Other     Hypertension Other        Social History:  Social History     Tobacco Use    Smoking status: Former Smoker     Types: Cigarettes     Last attempt to quit: 10/1/2011     Years since quittin.6    Smokeless tobacco: Never Used   Substance Use Topics    Alcohol use: No    Drug use: No       Allergies:  No Known Allergies      Review of Systems     Review of Systems   Constitutional: Negative for chills and fever. HENT: Positive for congestion. Negative for rhinorrhea and sore throat. Respiratory: Positive for cough. Negative for shortness of breath. Cardiovascular: Positive for chest pain. Gastrointestinal: Negative for abdominal pain, blood in stool, constipation, diarrhea, nausea and vomiting. Genitourinary: Negative for dysuria, frequency and hematuria. Musculoskeletal: Negative for back pain and myalgias. Skin: Negative for rash and wound. Neurological: Positive for headaches. Negative for dizziness. All other systems reviewed and are negative. Physical Exam     Visit Vitals  /52 (BP 1 Location: Left arm)   Pulse 61   Temp 98.7 °F (37.1 °C)   Resp 20   Ht 5' 6\" (1.676 m)   Wt 81.6 kg (180 lb)   SpO2 100%   BMI 29.05 kg/m²       Physical Exam   Constitutional: He is oriented to person, place, and time. He appears well-developed and well-nourished. No distress. HENT:   Head: Normocephalic and atraumatic. Bilateral nasal turbinate erythema and edema with rhinorrhea. Posterior oropharynx is erythematous without edema or exudates. Eyes: Pupils are equal, round, and reactive to light. Conjunctivae and EOM are normal. Right eye exhibits no discharge. Left eye exhibits no discharge. Neck: Normal range of motion. Neck supple. No thyromegaly present. Cardiovascular: Normal rate, regular rhythm and normal heart sounds. Pulmonary/Chest: Effort normal and breath sounds normal. No respiratory distress. He exhibits no tenderness. Lungs clear to auscultation bilaterally   Musculoskeletal: Normal range of motion. He exhibits no edema or deformity. No peripheral edema   Lymphadenopathy:     He has no cervical adenopathy. Neurological: He is alert and oriented to person, place, and time. No cranial nerve deficit. Pt is awake, alert, oriented x 3.  CNs 2-12 intact and normal.  No facial droop. Normal speech. Pt is answering questions and following commands appropriately. Pt ambulatory with even, steady gait, moving BUE and BLE with equal strength and intact distal neurovascular status. Skin: Skin is warm and dry. He is not diaphoretic. Psychiatric: He has a normal mood and affect. Nursing note and vitals reviewed.       Diagnostic Study Results     Labs -  Recent Results (from the past 12 hour(s))   EKG, 12 LEAD, INITIAL    Collection Time: 05/25/19  4:11 PM   Result Value Ref Range    Ventricular Rate 58 BPM    Atrial Rate 58 BPM    P-R Interval 144 ms    QRS Duration 102 ms    Q-T Interval 364 ms    QTC Calculation (Bezet) 357 ms    Calculated P Axis 73 degrees    Calculated R Axis 53 degrees    Calculated T Axis 41 degrees    Diagnosis       Sinus bradycardia with sinus arrhythmia  Otherwise normal ECG  No previous ECGs available     CBC WITH AUTOMATED DIFF    Collection Time: 05/25/19  4:20 PM   Result Value Ref Range    WBC 3.9 (L) 4.6 - 13.2 K/uL    RBC 4.53 (L) 4.70 - 5.50 M/uL    HGB 14.6 13.0 - 16.0 g/dL    HCT 42.1 36.0 - 48.0 %    MCV 92.9 74.0 - 97.0 FL MCH 32.2 24.0 - 34.0 PG    MCHC 34.7 31.0 - 37.0 g/dL    RDW 11.6 11.6 - 14.5 %    PLATELET 566 029 - 207 K/uL    MPV 9.0 (L) 9.2 - 11.8 FL    NEUTROPHILS 51 40 - 73 %    LYMPHOCYTES 39 21 - 52 %    MONOCYTES 9 3 - 10 %    EOSINOPHILS 1 0 - 5 %    BASOPHILS 0 0 - 2 %    ABS. NEUTROPHILS 2.0 1.8 - 8.0 K/UL    ABS. LYMPHOCYTES 1.5 0.9 - 3.6 K/UL    ABS. MONOCYTES 0.4 0.05 - 1.2 K/UL    ABS. EOSINOPHILS 0.1 0.0 - 0.4 K/UL    ABS. BASOPHILS 0.0 0.0 - 0.1 K/UL    DF AUTOMATED     METABOLIC PANEL, COMPREHENSIVE    Collection Time: 05/25/19  4:20 PM   Result Value Ref Range    Sodium 140 136 - 145 mmol/L    Potassium 3.9 3.5 - 5.5 mmol/L    Chloride 104 100 - 108 mmol/L    CO2 28 21 - 32 mmol/L    Anion gap 8 3.0 - 18 mmol/L    Glucose 78 74 - 99 mg/dL    BUN 13 7.0 - 18 MG/DL    Creatinine 1.10 0.6 - 1.3 MG/DL    BUN/Creatinine ratio 12 12 - 20      GFR est AA >60 >60 ml/min/1.73m2    GFR est non-AA >60 >60 ml/min/1.73m2    Calcium 9.0 8.5 - 10.1 MG/DL    Bilirubin, total 0.4 0.2 - 1.0 MG/DL    ALT (SGPT) 27 16 - 61 U/L    AST (SGOT) 23 15 - 37 U/L    Alk.  phosphatase 92 45 - 117 U/L    Protein, total 7.7 6.4 - 8.2 g/dL    Albumin 3.9 3.4 - 5.0 g/dL    Globulin 3.8 2.0 - 4.0 g/dL    A-G Ratio 1.0 0.8 - 1.7     MAGNESIUM    Collection Time: 05/25/19  4:20 PM   Result Value Ref Range    Magnesium 2.5 1.6 - 2.6 mg/dL   CARDIAC PANEL,(CK, CKMB & TROPONIN)    Collection Time: 05/25/19  4:20 PM   Result Value Ref Range     (H) 39 - 308 U/L    CK - MB 3.2 <3.6 ng/ml    CK-MB Index 0.9 0.0 - 4.0 %    Troponin-I, QT <0.02 0.0 - 0.045 NG/ML   MONONUCLEOSIS SCREEN    Collection Time: 05/25/19  4:20 PM   Result Value Ref Range    Mononucleosis screen NEGATIVE      URINALYSIS W/ RFLX MICROSCOPIC    Collection Time: 05/25/19  4:30 PM   Result Value Ref Range    Color YELLOW      Appearance CLEAR      Specific gravity 1.017 1.005 - 1.030      pH (UA) 6.0 5.0 - 8.0      Protein NEGATIVE  NEG mg/dL    Glucose NEGATIVE  NEG mg/dL Ketone NEGATIVE  NEG mg/dL    Bilirubin NEGATIVE  NEG      Blood NEGATIVE  NEG      Urobilinogen 1.0 0.2 - 1.0 EU/dL    Nitrites NEGATIVE  NEG      Leukocyte Esterase NEGATIVE  NEG     DRUG SCREEN, URINE    Collection Time: 05/25/19  4:30 PM   Result Value Ref Range    BENZODIAZEPINES NEGATIVE  NEG      BARBITURATES NEGATIVE  NEG      THC (TH-CANNABINOL) NEGATIVE  NEG      OPIATES NEGATIVE  NEG      PCP(PHENCYCLIDINE) NEGATIVE  NEG      COCAINE NEGATIVE  NEG      AMPHETAMINES NEGATIVE  NEG      METHADONE NEGATIVE  NEG      HDSCOM (NOTE)        Radiologic Studies -   Xr Chest Pa Lat    Result Date: 5/25/2019  EXAM: CHEST PA AND LATERAL CLINICAL HISTORY/INDICATION:  chest pain intermittent transitory migratory chest pain x1 week associated with headache COMPARISON: Chest x-ray August 4, 2018. TECHNIQUE: PA and lateral views FINDINGS:  The cardiac and mediastinal silhouette is normal.  The lungs are clear. The costophrenic angles are sharply defined. Pulmonary vascularity is normal. No bony abnormalities are seen. Negative chest.        Medical Decision Making   I am the first provider for this patient. I reviewed the vital signs, available nursing notes, past medical history, past surgical history, family history and social history. Vital Signs-Reviewed the patient's vital signs. Pulse Oximetry Analysis -  100% on room air (Interpretation)    EKG: Interpreted by the EP. Time Interpreted:    Rate:    Rhythm: Sinus bradycardia with sinus arrhythmia   Interpretation:   Comparison:     Records Reviewed: Nursing Notes and Old Medical Records (Time of Review: 4:19 PM)    ED Course: Progress Notes, Reevaluation, and Consults:      Provider Notes (Medical Decision Making):   differential diagnosis: Unlikely ACS, viral URI, costochondritis, migraines, tension headache, dehydration, electrolyte abnormality, mono    Plan: Pt presents in NAD, vitals wnl. Work-up unremarkable here. Treated with toradol here. Will DC home with fioricet. Advised to follow-up with PCP. At this time, patient is stable and appropriate for discharge home. Patient demonstrates understanding of current diagnoses and is in agreement with the treatment plan. They are advised that while the likelihood of serious underlying condition is low at this point given the evaluation performed today, we cannot fully rule it out. They are advised to immediately return with any new symptoms or worsening of current condition. All questions have been answered. Patient is given educational material regarding their diagnoses, including danger symptoms and when to return to the ED. Diagnosis     Clinical Impression:   1. Atypical chest pain    2. Acute nonintractable headache, unspecified headache type        Disposition: DC Home    Follow-up Information     Follow up With Specialties Details Why Contact Info    Kt Warren MD Family Practice Call in 2 days  [address] [phone] [facility] EMERGENCY DEPT Emergency Medicine Go to As needed, If symptoms worsen [id number] [facility]  [phone]           Patient's Medications   Start Taking    BUTALBITAL-ACETAMINOPHEN-CAFF (FIORICET) -40 MG PER CAPSULE    Take 1 Cap by mouth every four (4) hours as needed for Pain. Continue Taking    BUTALBITAL-ACETAMINOPHEN-CAFFEINE (FIORICET, ESGIC) -40 MG PER TABLET    Take 1 Tab by mouth. FLUTICASONE (FLONASE) 50 MCG/ACTUATION NASAL SPRAY    2 Sprays by Both Nostrils route daily. TOPIRAMATE (TOPAMAX) 50 MG TABLET    Take 50 mg by mouth nightly. These Medications have changed    No medications on file   Stop Taking    MECLIZINE (ANTIVERT) 25 MG TABLET    Take 1 Tab by mouth three (3) times daily as needed.      _______________________________

## 2019-05-25 NOTE — ED TRIAGE NOTES
Pt c/o CP off and on for one week. States pain comes and goes. Moves around. Denies pain at this time. Also c/o a headache that he has had \"for a long time\" Does not want any med for H/A at this time.  (Fioricet was offered by PA)

## 2019-05-25 NOTE — ED NOTES
Floresita Carrasco is a 33 y.o. male that was discharged in stable. Pt was accompanied by spouse. Pt is not driving. The patients diagnosis, condition and treatment were explained to  patient and aftercare instructions were given. The patient verbalized understanding. Patient armband removed and shredded.

## 2019-05-25 NOTE — DISCHARGE INSTRUCTIONS
Patient Education     Please return immediately to the Emergency Room for re-evaluation if you are not improving, develop any new symptoms, or develop worsening of current symptoms! If you have been prescribed a medication and are unable to take this medication for any reason, please return to the Emergency Department for further evaluation! If you have been referred for follow-up to a specialist, but are unable to follow-up and your symptoms are either not improving or are worsening, please return to the Emergency Department for further evaluation! Musculoskeletal Chest Pain: Care Instructions  Your Care Instructions    Chest pain is not always a sign that something is wrong with your heart or that you have another serious problem. The doctor thinks your chest pain is caused by strained muscles or ligaments, inflamed chest cartilage, or another problem in your chest, rather than by your heart. You may need more tests to find the cause of your chest pain. Follow-up care is a key part of your treatment and safety. Be sure to make and go to all appointments, and call your doctor if you are having problems. It's also a good idea to know your test results and keep a list of the medicines you take. How can you care for yourself at home? · Take pain medicines exactly as directed. ? If the doctor gave you a prescription medicine for pain, take it as prescribed. ? If you are not taking a prescription pain medicine, ask your doctor if you can take an over-the-counter medicine. · Rest and protect the sore area. · Stop, change, or take a break from any activity that may be causing your pain or soreness. · Put ice or a cold pack on the sore area for 10 to 20 minutes at a time. Try to do this every 1 to 2 hours for the next 3 days (when you are awake) or until the swelling goes down. Put a thin cloth between the ice and your skin.   · After 2 or 3 days, apply a heating pad set on low or a warm cloth to the area that hurts. Some doctors suggest that you go back and forth between hot and cold. · Do not wrap or tape your ribs for support. This may cause you to take smaller breaths, which could increase your risk of lung problems. · Mentholated creams such as Bengay or Icy Hot may soothe sore muscles. Follow the instructions on the package. · Follow your doctor's instructions for exercising. · Gentle stretching and massage may help you get better faster. Stretch slowly to the point just before pain begins, and hold the stretch for at least 15 to 30 seconds. Do this 3 or 4 times a day. Stretch just after you have applied heat. · As your pain gets better, slowly return to your normal activities. Any increased pain may be a sign that you need to rest a while longer. When should you call for help? Call 911 anytime you think you may need emergency care. For example, call if:    · You have chest pain or pressure. This may occur with:  ? Sweating. ? Shortness of breath. ? Nausea or vomiting. ? Pain that spreads from the chest to the neck, jaw, or one or both shoulders or arms. ? Dizziness or lightheadedness. ? A fast or uneven pulse. After calling 911, chew 1 adult-strength aspirin. Wait for an ambulance. Do not try to drive yourself.     · You have sudden chest pain and shortness of breath, or you cough up blood.    Call your doctor now or seek immediate medical care if:    · You have any trouble breathing.     · Your chest pain gets worse.     · Your chest pain occurs consistently with exercise and is relieved by rest.    Watch closely for changes in your health, and be sure to contact your doctor if:    · Your chest pain does not get better after 1 week. Where can you learn more? Go to http://jay-isidoro.info/. Enter V293 in the search box to learn more about \"Musculoskeletal Chest Pain: Care Instructions. \"  Current as of: September 23, 2018  Content Version: 11.9  © 5764-9672 [employer]. Care instructions adapted under license by [employer] (which disclaims liability or warranty for this information). If you have questions about a medical condition or this instruction, always ask your healthcare professional. [address] any warranty or liability for your use of this information.

## 2019-05-26 LAB
ATRIAL RATE: 58 BPM
CALCULATED P AXIS, ECG09: 73 DEGREES
CALCULATED R AXIS, ECG10: 53 DEGREES
CALCULATED T AXIS, ECG11: 41 DEGREES
DIAGNOSIS, 93000: NORMAL
P-R INTERVAL, ECG05: 144 MS
Q-T INTERVAL, ECG07: 364 MS
QRS DURATION, ECG06: 102 MS
QTC CALCULATION (BEZET), ECG08: 357 MS
VENTRICULAR RATE, ECG03: 58 BPM

## 2019-06-20 ENCOUNTER — OFFICE VISIT (OUTPATIENT)
Dept: NEUROLOGY | Age: 29
End: 2019-06-20

## 2019-06-20 VITALS
BODY MASS INDEX: 28.7 KG/M2 | OXYGEN SATURATION: 97 % | DIASTOLIC BLOOD PRESSURE: 72 MMHG | SYSTOLIC BLOOD PRESSURE: 112 MMHG | HEIGHT: 66 IN | WEIGHT: 178.6 LBS | TEMPERATURE: 98 F | HEART RATE: 64 BPM | RESPIRATION RATE: 18 BRPM

## 2019-06-20 DIAGNOSIS — G43.019 INTRACTABLE MIGRAINE WITHOUT AURA AND WITHOUT STATUS MIGRAINOSUS: Primary | ICD-10-CM

## 2019-06-20 RX ORDER — TOPIRAMATE 50 MG/1
50 TABLET, FILM COATED ORAL 2 TIMES DAILY
Qty: 60 TAB | Refills: 5 | Status: SHIPPED | OUTPATIENT
Start: 2019-06-20 | End: 2019-09-12 | Stop reason: ALTCHOICE

## 2019-06-20 RX ORDER — SUMATRIPTAN 100 MG/1
100 TABLET, FILM COATED ORAL
Qty: 9 TAB | Refills: 5 | Status: SHIPPED | OUTPATIENT
Start: 2019-06-20 | End: 2019-06-20

## 2019-06-20 RX ORDER — ERENUMAB-AOOE 70 MG/ML
INJECTION SUBCUTANEOUS
COMMUNITY
Start: 2019-06-12 | End: 2019-06-20

## 2019-06-20 NOTE — PROGRESS NOTES
Lauren Lemos is a 33 y.o., right handed male, with no past medical history except for chronic migraines. He first started having getting headaches about 18 months ago. They started spontaneously. The headaches are migratory, sometimes bifrontal, bitemporal and others occipital.  The intensity ranges from 6-10/10 when they occur. They would occur at least 2-3 times per month, lasting up to a week when they happen. There was no nausea or vomiting with the pain. No motor or sensory changes. He'd get warning that his eyesight would get blurred before the pain would start. He's been tried on elavil, which was ineffective. He's currently on a combination of Topamax, Fioricet and Aimovig. He's taken the Aimovig only once and it really didn't seem to help. He's gotten breakthrough headaches even on the Aimovig. Topamax on the current dose is ineffective. The Elavil didn't work. His mother and brother get migraines. Social History; single, lives with his mother. No alcohol, tobacco or illicit drugs. Works as a . Family History; mother alive with diabetes and hypertension. Also has migraine. Father alive with diabetes. Sibling with migraine. Current Outpatient Medications   Medication Sig Dispense Refill    AIMOVIG AUTOINJECTOR 70 mg/mL injection       fluticasone (FLONASE) 50 mcg/actuation nasal spray 2 Sprays by Both Nostrils route daily. 1 Bottle 0    butalbital-acetaminophen-caff (FIORICET) -40 mg per capsule Take 1 Cap by mouth every four (4) hours as needed for Pain. 12 Cap 0    butalbital-acetaminophen-caffeine (FIORICET, ESGIC) -40 mg per tablet Take 1 Tab by mouth.  topiramate (TOPAMAX) 50 mg tablet Take 50 mg by mouth nightly.          Past Medical History:   Diagnosis Date    Migraine        Past Surgical History:   Procedure Laterality Date    HX OTHER SURGICAL      tubes placed in ears       No Known Allergies    Patient Active Problem List Diagnosis Code    Pain in testicle N50.819         Review of Systems:   As above otherwise 11 point review of systems negative including;   Constitutional no fever or chills  Skin denies rash or itching  HENT  Denies tinnitus, hearing lose  Eyes denies diplopia vision lose  Respiratory denies shortness of breath  Cardiovascular denies chest pain, dyspnea on exertion  Gastrointestinal denies nausea, vomiting, diarrhea, constipation  Genitourinary denies incontinence  Musculoskeletal denies joint pain or swelling  Endocrine denies weight change  Hematology denies easy bruising or bleeding   Neurological as above in HPI      PHYSICAL EXAMINATION:      VITAL SIGNS:    Visit Vitals  /72 (BP 1 Location: Left arm, BP Patient Position: Sitting)   Pulse 64   Temp 98 °F (36.7 °C) (Oral)   Resp 18   Ht 5' 6\" (1.676 m)   Wt 81 kg (178 lb 9.6 oz)   SpO2 97%   BMI 28.83 kg/m²       GENERAL: The patient is well developed, well nourished, and in no apparent distress. EXTREMITIES: No clubbing, cyanosis, or edema is identified. Pulses 2+ and symmetrical.  Muscle tone is normal.  HEAD:   Ear, nose, and throat appear to be without trauma. The patient is normocephalic. NEUROLOGIC EXAMINATION    MENTAL STATUS: The patient is awake, alert, and oriented x 4. Fund of knowledge is adequate. Speech is fluent and memory appears to be intact, both long and short term. CRANIAL NERVES: II - Visual fields are full to confrontation. Funduscopic examination reveals flat disks bilaterally. Pupils are both 4 mm and briskly reactive to light and accommodation. III, IV, VI - Extraocular movements are intact and there is no nystagmus. V - Facial sensation is intact to pinprick and light touch. VII - Face is symmetrical.   VIII - Hearing is present. IX, X, XII- Palate rises symmetrically. Gag is present. Tongue is in the midline.       XI - Shoulder shrugging and head turning intact  MOTOR:  The patient is 5/5 in all four limbs without any drift. Fine finger movements are symmetrical.  Isolated motor group testing reveals no focal abnormalities. Tone is normal.  Sensory examination is intact to pinprick, light touch and position sense testing. Reflexes are 2+ and symmetrical. Plantars are down going. Cerebellar examination reveals no gross ataxia or dysmetria. Gait is normal and the patient can tandem walk without any difficulty. Final result (Exam End: 4/1/2017 11:06) Provider Status: Open   Study Result     EXAM: CT of the Head without contrast     INDICATION: Migraines and visual changes.     TECHNIQUE: CT of the head from the vertex to the skull base performed. No IV  contrast administered.     All CT scans at this facility are performed using dose optimization technique as  appropriate to a performed exam, to include automated exposure control,  adjustment of the mA and/or kV according to patient size (including appropriate  matching for site specific examination) or use of iterative reconstruction  technique. COMPARISON: None.     FINDINGS: No evidence of acute intra-axial or extra-axial hemorrhage. The  ventricles and sulci are symmetric. No midline shift, mass effect or mass  lesion appreciated. The gray-white junction is preserved. No evidence of an  acute infarct identified. The mastoid air cells are well aerated. Opacified  ethmoid air cell on the right is noted. The orbits are normal. The scalp and  skull are unremarkable.     IMPRESSION  IMPRESSION:  1. No acute intracranial process identified. I have reviewed the above imagines myself.        CBC:   Lab Results   Component Value Date/Time    WBC 3.9 (L) 05/25/2019 04:20 PM    RBC 4.53 (L) 05/25/2019 04:20 PM    HGB 14.6 05/25/2019 04:20 PM    HCT 42.1 05/25/2019 04:20 PM    PLATELET 955 82/63/1723 04:20 PM     BMP:   Lab Results   Component Value Date/Time    Glucose 78 05/25/2019 04:20 PM    Sodium 140 05/25/2019 04:20 PM    Potassium 3.9 05/25/2019 04:20 PM    Chloride 104 05/25/2019 04:20 PM    CO2 28 05/25/2019 04:20 PM    BUN 13 05/25/2019 04:20 PM    Creatinine 1.10 05/25/2019 04:20 PM    Calcium 9.0 05/25/2019 04:20 PM     CMP:   Lab Results   Component Value Date/Time    Glucose 78 05/25/2019 04:20 PM    Sodium 140 05/25/2019 04:20 PM    Potassium 3.9 05/25/2019 04:20 PM    Chloride 104 05/25/2019 04:20 PM    CO2 28 05/25/2019 04:20 PM    BUN 13 05/25/2019 04:20 PM    Creatinine 1.10 05/25/2019 04:20 PM    Calcium 9.0 05/25/2019 04:20 PM    Anion gap 8 05/25/2019 04:20 PM    BUN/Creatinine ratio 12 05/25/2019 04:20 PM    Alk. phosphatase 92 05/25/2019 04:20 PM    Protein, total 7.7 05/25/2019 04:20 PM    Albumin 3.9 05/25/2019 04:20 PM    Globulin 3.8 05/25/2019 04:20 PM    A-G Ratio 1.0 05/25/2019 04:20 PM     Coagulation: No results found for: PTP, INR, APTT, PTTT  Cardiac markers:   Lab Results   Component Value Date/Time     (H) 05/25/2019 04:20 PM    CK-MB Index 0.9 05/25/2019 04:20 PM          Impression: Migraine headaches in this young man who has risk factors including significant family history of headaches. When I review his medical record it seems like he had a CAT scan back in April 2017. This was for headaches. He has had headaches for greater than 2 years. Currently has a normal examination. So far attempts to alleviate his headaches have been unsuccessful. Plan: I am going to increase his Topamax from 50 a day to 50 twice a day. Give him a prescription for Imitrex to use at the onset of a headache. Belvin Glass will be discontinued, it seems to be ineffective and is probably not the best medication for his current symptoms. I advised him to limit the amount of Fioricet that he uses. I will see him back here in approximately 6 weeks. He was told to keep a headache calendar. PLEASE NOTE:   This document has been produced using voice recognition software. Unrecognized errors in transcription may be present.

## 2019-06-20 NOTE — LETTER
6/20/19 Patient: Ashley Whalen YOB: 1990 Date of Visit: 6/20/2019 Aruna Sosa MD 
[facility] [address] [facility] [address] VIA In Basket Dear Aruna Sosa MD, Thank you for referring Mr. Charissa Kowalski to i  for evaluation. My notes for this consultation are attached. If you have questions, please do not hesitate to call me. I look forward to following your patient along with you.  
 
 
Sincerely, 
 
Carroll Butler MD
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
## 2019-09-12 ENCOUNTER — OFFICE VISIT (OUTPATIENT)
Dept: NEUROLOGY | Age: 29
End: 2019-09-12

## 2019-09-12 VITALS
TEMPERATURE: 98.3 F | RESPIRATION RATE: 16 BRPM | OXYGEN SATURATION: 97 % | BODY MASS INDEX: 29.09 KG/M2 | DIASTOLIC BLOOD PRESSURE: 68 MMHG | WEIGHT: 181 LBS | HEART RATE: 79 BPM | HEIGHT: 66 IN | SYSTOLIC BLOOD PRESSURE: 110 MMHG

## 2019-09-12 DIAGNOSIS — G43.111 INTRACTABLE MIGRAINE WITH AURA WITH STATUS MIGRAINOSUS: Primary | ICD-10-CM

## 2019-09-12 RX ORDER — METHYLPREDNISOLONE 4 MG/1
TABLET ORAL
Qty: 1 DOSE PACK | Refills: 0 | Status: SHIPPED | OUTPATIENT
Start: 2019-09-12 | End: 2019-11-12

## 2019-09-12 RX ORDER — SUMATRIPTAN 100 MG/1
TABLET, FILM COATED ORAL
COMMUNITY
Start: 2019-09-08 | End: 2019-11-12 | Stop reason: SDUPTHER

## 2019-09-12 RX ORDER — TOPIRAMATE 25 MG/1
TABLET ORAL
Qty: 60 TAB | Refills: 6 | Status: SHIPPED | OUTPATIENT
Start: 2019-09-12 | End: 2019-11-12
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
## 2019-09-12 NOTE — PATIENT INSTRUCTIONS
Topiramate (By mouth)   Topiramate (toe-PIR-a-mate)  Treats and prevents seizures, and helps prevent migraine headaches. Brand Name(s): Qudexy XR, Topamax, Trokendi XR   There may be other brand names for this medicine. When This Medicine Should Not Be Used: This medicine is not right for everyone. Do not use it if you had an allergic reaction to topiramate, or if you are pregnant. How to Use This Medicine:   Capsule, Long Acting Capsule, Tablet  · Take your medicine as directed. Your dose may need to be changed several times to find what works best for you. · Tablet: Swallow whole. Do not break, crush, or chew the tablet. It has a very bitter taste. · Capsule or extended-release capsule: Do not crush or chew the capsule. Swallow whole or open the capsule and pour the medicine into a small amount (1 teaspoon) of soft food, such as applesauce. Swallow the mixture right away without chewing. Do not store the mixture for use at a later time. · Drink extra fluids so you will urinate more often and help prevent kidney problems. · This medicine should come with a Medication Guide. Ask your pharmacist for a copy if you do not have one. · Missed dose: Take a dose as soon as you remember. If it is almost time for your next dose, wait until then and take a regular dose. Do not take extra medicine to make up for a missed dose. If you miss a dose or forget to use your medicine, use it as soon as you can. If your next regular dose of Topamax® is less than 6 hours away, wait until then to use the medicine and skip the missed dose. If you miss more than 1 dose of Topamax®, call your doctor for instructions. · Store the medicine in a closed container at room temperature, away from heat, moisture, and direct light. Drugs and Foods to Avoid:   Ask your doctor or pharmacist before using any other medicine, including over-the-counter medicines, vitamins, and herbal products.   · Do not drink alcohol with Qudexy XR or Topamax®. Do not drink alcohol for 6 hours before and 6 hours after you take the Trokendi XR capsule. · Some medicines can affect how topiramate works. Tell your doctor if you are using acetazolamide, dichlorphenamide, dichloralphenazone, digoxin, lithium, metformin, zonisamide, other medicine for seizures (such as carbamazepine, phenytoin, valproic acid), or birth control pills. · Tell your doctor if you are using any medicine that makes you sleepy, such as allergy medicine or narcotic pain medicine. Warnings While Using This Medicine:   · It is not safe to take this medicine during pregnancy. It could harm an unborn baby. Tell your doctor right away if you become pregnant. · Tell your doctor if you are breastfeeding, or if you have kidney disease, liver disease, glaucoma, lung or breathing problems, osteoporosis, or a history of depression or mood disorders. Tell your doctor if you are on a ketogenic diet (high in fat and low in carbohydrates). · This medicine may cause the following problems:  ¨ Eye pain or vision changes, including glaucoma  ¨ Changes in body temperature  ¨ Metabolic acidosis (too much acid in the blood)  ¨ Kidney stones  · This medicine may increase depression or thoughts of suicide. Tell your doctor right away if you start to feel more depressed or think about hurting yourself. · This medicine may make you dizzy, drowsy, or tired. Do not drive or do anything else that could be dangerous until you know how this medicine affects you. · Do not stop using this medicine suddenly. Your doctor will need to slowly decrease your dose before you stop it completely. · Your doctor will do lab tests at regular visits to check on the effects of this medicine. Keep all appointments. · Keep all medicine out of the reach of children. Never share your medicine with anyone.   Possible Side Effects While Using This Medicine:   Call your doctor right away if you notice any of these side effects:  · Allergic reaction: Itching or hives, swelling in your face or hands, swelling or tingling in your mouth or throat, chest tightness, trouble breathing  · Bloody or cloudy urine, painful urination, sudden lower back or stomach pain  · Changes in vision, eye pain  · Confusion, problems with walking, clumsiness, dizziness, or trouble talking, concentrating, or remembering  · Feeling agitated, depressed, nervous, or irritable, thoughts of hurting yourself or others, unusual mood or behavior  · Fever, decreased sweating  · Numbness, tingling, or burning pain in your hands, arms, legs, or feet  · Rapid, deep breathing, loss of appetite, fast or uneven heartbeat  · Vomiting, unusual drowsiness, tiredness, or weakness  If you notice these less serious side effects, talk with your doctor:   · Change in taste  · Nausea, diarrhea  · Stuffy or runny nose  · Weight loss  If you notice other side effects that you think are caused by this medicine, tell your doctor. Call your doctor for medical advice about side effects. You may report side effects to FDA at 6-250-FDA-0937  © 2017 [facility] Information is for End User's use only and may not be sold, redistributed or otherwise used for commercial purposes. The above information is an  only. It is not intended as medical advice for individual conditions or treatments. Talk to your doctor, nurse or pharmacist before following any medical regimen to see if it is safe and effective for you.

## 2019-09-12 NOTE — PROGRESS NOTES
Chief Complaint   Patient presents with    Follow-up    Migraine     Pt states he has not had a migriane in months but has one today. He also has not been on any of his medications. He has recently picked up Imitrex from pharmacy.

## 2019-09-18 ENCOUNTER — HOSPITAL ENCOUNTER (EMERGENCY)
Age: 29
Discharge: HOME | End: 2019-09-18
Attending: EMERGENCY MEDICINE
Payer: COMMERCIAL

## 2019-09-18 VITALS
HEART RATE: 68 BPM | DIASTOLIC BLOOD PRESSURE: 65 MMHG | WEIGHT: 175 LBS | HEIGHT: 66 IN | BODY MASS INDEX: 28.12 KG/M2 | SYSTOLIC BLOOD PRESSURE: 140 MMHG | RESPIRATION RATE: 18 BRPM | OXYGEN SATURATION: 98 % | TEMPERATURE: 98.8 F

## 2019-09-18 DIAGNOSIS — B83.9 WORMS IN STOOL: Primary | ICD-10-CM

## 2019-09-18 PROCEDURE — 87177 OVA AND PARASITES SMEARS: CPT

## 2019-09-18 PROCEDURE — 74011250637 HC RX REV CODE- 250/637: Performed by: PHYSICIAN ASSISTANT

## 2019-09-18 PROCEDURE — 87045 FECES CULTURE AEROBIC BACT: CPT

## 2019-09-18 PROCEDURE — 99284 EMERGENCY DEPT VISIT MOD MDM: CPT

## 2019-09-18 RX ORDER — DIAZEPAM 5 MG/1
5 TABLET ORAL
Status: COMPLETED | OUTPATIENT
Start: 2019-09-18 | End: 2019-09-18

## 2019-09-18 RX ADMIN — DIAZEPAM 5 MG: 5 TABLET ORAL at 21:47

## 2019-09-19 NOTE — ED PROVIDER NOTES
EMERGENCY DEPARTMENT HISTORY AND PHYSICAL EXAM    Date: 9/18/2019  Patient Name: Gerardo Whitehead    History of Presenting Illness     Chief Complaint   Patient presents with    Other     worms in stool         History Provided By: Patient        Additional History (Context): Gerardo Whitehead is a 26 y.o. male with No significant past medical history who presents with complaint of seeing worms in his stool this morning. Patient denies travel outside of the [place] or known ill contacts with similar symptoms. Patient denies hematochezia, no bright red blood per rectum no previous similar symptoms and no abdominal pain. PCP: Alena Alicea MD    Current Facility-Administered Medications   Medication Dose Route Frequency Provider Last Rate Last Dose    diazePAM (VALIUM) tablet 5 mg  5 mg Oral NOW Dena Zhu PA         Current Outpatient Medications   Medication Sig Dispense Refill    SUMAtriptan (IMITREX) 100 mg tablet       topiramate (TOPAMAX) 25 mg tablet Take 25 mg by mouth daily at bedtime for one week then increase to twice daily. Indications: Migraine Prevention 60 Tab 6    methylPREDNISolone (MEDROL DOSEPACK) 4 mg tablet Take by mouth per directions on pack. Take with food. 1 Dose Pack 0    aspirin-acetaminophen-caffeine (EXCEDRIN ES) 250-250-65 mg per tablet Take 1 Tab by mouth.  fluticasone (FLONASE) 50 mcg/actuation nasal spray 2 Sprays by Both Nostrils route daily.  1 Bottle 0       Past History     Past Medical History:  Past Medical History:   Diagnosis Date    Migraine        Past Surgical History:  Past Surgical History:   Procedure Laterality Date    HX OTHER SURGICAL      tubes placed in ears       Family History:  Family History   Problem Relation Age of Onset    Diabetes Other     Hypertension Other        Social History:  Social History     Tobacco Use    Smoking status: Former Smoker     Types: Cigarettes     Last attempt to quit: 10/1/2011 Years since quittin.9    Smokeless tobacco: Never Used   Substance Use Topics    Alcohol use: No    Drug use: No       Allergies:  No Known Allergies      Review of Systems   Review of Systems  Review of Systems   Constitutional: Negative for fatigue and fever. HENT: Negative for congestion. Respiratory: Negative for cough and shortness of breath. Cardiovascular: Negative for chest pain. Gastrointestinal: Negative for abdominal pain, diarrhea, nausea and vomiting. Worms seen in toilet bowl after bowel movement today  Genitourinary: Negative for difficulty urinating and dysuria. Musculoskeletal: Negative joint pain, joint swelling, recent injury. Skin: Negative for wound. Neurological: Negative for dizziness and headaches. All other systems reviewed and are negative. All Other Systems Negative  Physical Exam     Vitals:    19 1908   BP: 133/81   Pulse: 68   Resp: 18   Temp: 98.8 °F (37.1 °C)   SpO2: 99%   Weight: 79.4 kg (175 lb)   Height: 5' 6\" (1.676 m)     Physical Exam     Constitutional: Pt is oriented to person, place, and time. Pt appears well-developed and well-nourished. HENT:   Head: Normocephalic and atraumatic. Mouth/Throat: Oropharynx is clear and moist.   Eyes: Pupils are equal, round, and reactive to light. Neck: Normal range of motion. Neck supple. No tracheal deviation present. No thyromegaly present. Cardiovascular: Normal rate, regular rhythm and normal heart sounds. No murmur heard. Pulmonary/Chest: Effort normal and breath sounds normal. No respiratory distress. No wheezes or rales. Abdominal: Soft. no distension and no mass. There is no tenderness. There is no rebound and no guarding. Musculoskeletal: Normal range of motion. No edema or deformity. Neurological: Pt is alert and oriented to person, place, and time   Skin: Skin is warm and dry.    Psychiatric: Pt has a normal mood and affect;  behavior is normal. Judgment and thought content normal.           Diagnostic Study Results     Labs -   No results found for this or any previous visit (from the past 12 hour(s)). Radiologic Studies -   No orders to display     CT Results  (Last 48 hours)    None        CXR Results  (Last 48 hours)    None            Medical Decision Making   I am the first provider for this patient. I reviewed the vital signs, available nursing notes, past medical history, past surgical history, family history and social history. Vital Signs-Reviewed the patient's vital signs. Comparison:    Records Reviewed: Nursing Notes    Procedures:  Procedures    Provider Notes (Medical Decision Making):     MED RECONCILIATION:  Current Facility-Administered Medications   Medication Dose Route Frequency    diazePAM (VALIUM) tablet 5 mg  5 mg Oral NOW     Current Outpatient Medications   Medication Sig    SUMAtriptan (IMITREX) 100 mg tablet     topiramate (TOPAMAX) 25 mg tablet Take 25 mg by mouth daily at bedtime for one week then increase to twice daily. Indications: Migraine Prevention    methylPREDNISolone (MEDROL DOSEPACK) 4 mg tablet Take by mouth per directions on pack. Take with food.  aspirin-acetaminophen-caffeine (EXCEDRIN ES) 250-250-65 mg per tablet Take 1 Tab by mouth.  fluticasone (FLONASE) 50 mcg/actuation nasal spray 2 Sprays by Both Nostrils route daily. Disposition:  Home    DISCHARGE NOTE:     Patient seen, examined and discharged from triage. Patient has no other complaints, changes, or physical findings. Due to delay in lab processing of specimen I will call the patient tomorrow when this is resulted. Care plan outlined and precautions discussed. Results of exam were reviewed with the patient. All medications were reviewed with the patient; will d/c home with follow up instructions. All of pt's questions and concerns were addressed.  Patient was instructed and agrees to follow up with primary care, as well as to return to the ED upon further deterioration. Patient is ready to go home. Follow-up Information     Follow up With Specialties Details [facility]  EMERGENCY DEPT Emergency Medicine Call after [address] [facility]  [phone]          Current Discharge Medication List              Diagnosis     Clinical Impression:   1.  Worms in stool

## 2019-09-21 LAB
BACTERIA SPEC CULT: NORMAL
SERVICE CMNT-IMP: NORMAL

## 2019-09-24 LAB
O+P SPEC MICRO: NORMAL
O+P STL CONC: NORMAL
SPECIMEN SOURCE: NORMAL

## 2019-09-29 ENCOUNTER — APPOINTMENT (OUTPATIENT)
Dept: GENERAL RADIOLOGY | Age: 29
End: 2019-09-29
Attending: EMERGENCY MEDICINE
Payer: COMMERCIAL

## 2019-09-29 ENCOUNTER — HOSPITAL ENCOUNTER (EMERGENCY)
Age: 29
Discharge: HOME | End: 2019-09-29
Attending: EMERGENCY MEDICINE
Payer: COMMERCIAL

## 2019-09-29 VITALS
WEIGHT: 175 LBS | OXYGEN SATURATION: 100 % | SYSTOLIC BLOOD PRESSURE: 114 MMHG | BODY MASS INDEX: 28.25 KG/M2 | RESPIRATION RATE: 16 BRPM | TEMPERATURE: 98.3 F | HEART RATE: 53 BPM | DIASTOLIC BLOOD PRESSURE: 66 MMHG

## 2019-09-29 DIAGNOSIS — R19.5 INCREASED MUCUS IN STOOL: Primary | ICD-10-CM

## 2019-09-29 DIAGNOSIS — K59.00 CONSTIPATION, UNSPECIFIED CONSTIPATION TYPE: ICD-10-CM

## 2019-09-29 LAB
ANION GAP SERPL CALC-SCNC: 6 MMOL/L (ref 3–18)
BASOPHILS # BLD: 0 K/UL (ref 0–0.1)
BASOPHILS NFR BLD: 1 % (ref 0–2)
BUN SERPL-MCNC: 14 MG/DL (ref 7–18)
BUN/CREAT SERPL: 12 (ref 12–20)
CALCIUM SERPL-MCNC: 9.3 MG/DL (ref 8.5–10.1)
CHLORIDE SERPL-SCNC: 105 MMOL/L (ref 100–111)
CO2 SERPL-SCNC: 29 MMOL/L (ref 21–32)
CREAT SERPL-MCNC: 1.19 MG/DL (ref 0.6–1.3)
DIFFERENTIAL METHOD BLD: ABNORMAL
EOSINOPHIL # BLD: 0.1 K/UL (ref 0–0.4)
EOSINOPHIL NFR BLD: 2 % (ref 0–5)
ERYTHROCYTE [DISTWIDTH] IN BLOOD BY AUTOMATED COUNT: 11.6 % (ref 11.6–14.5)
GLUCOSE SERPL-MCNC: 85 MG/DL (ref 74–99)
HCT VFR BLD AUTO: 42.3 % (ref 36–48)
HGB BLD-MCNC: 14.9 G/DL (ref 13–16)
LYMPHOCYTES # BLD: 1.5 K/UL (ref 0.9–3.6)
LYMPHOCYTES NFR BLD: 40 % (ref 21–52)
MCH RBC QN AUTO: 32.3 PG (ref 24–34)
MCHC RBC AUTO-ENTMCNC: 35.2 G/DL (ref 31–37)
MCV RBC AUTO: 91.6 FL (ref 74–97)
MONOCYTES # BLD: 0.4 K/UL (ref 0.05–1.2)
MONOCYTES NFR BLD: 11 % (ref 3–10)
NEUTS SEG # BLD: 1.7 K/UL (ref 1.8–8)
NEUTS SEG NFR BLD: 46 % (ref 40–73)
PLATELET # BLD AUTO: 216 K/UL (ref 135–420)
PMV BLD AUTO: 9.2 FL (ref 9.2–11.8)
POTASSIUM SERPL-SCNC: 3.9 MMOL/L (ref 3.5–5.5)
RBC # BLD AUTO: 4.62 M/UL (ref 4.7–5.5)
SODIUM SERPL-SCNC: 140 MMOL/L (ref 136–145)
WBC # BLD AUTO: 3.7 K/UL (ref 4.6–13.2)

## 2019-09-29 PROCEDURE — 74019 RADEX ABDOMEN 2 VIEWS: CPT

## 2019-09-29 PROCEDURE — 80048 BASIC METABOLIC PNL TOTAL CA: CPT

## 2019-09-29 PROCEDURE — 87177 OVA AND PARASITES SMEARS: CPT

## 2019-09-29 PROCEDURE — 87045 FECES CULTURE AEROBIC BACT: CPT

## 2019-09-29 PROCEDURE — 85025 COMPLETE CBC W/AUTO DIFF WBC: CPT

## 2019-09-29 PROCEDURE — 99282 EMERGENCY DEPT VISIT SF MDM: CPT

## 2019-09-29 NOTE — DISCHARGE INSTRUCTIONS
1.  Preliminary reading of abdominal x-ray demonstrates a nonspecific bowel gas pattern without blockage. 2.  Routine labs are within normal limits and white blood cell count is normal.  3.  Stool was again sent for culture, both bacterial pathogens as well as parasites. 4.  Recommend increasing dietary fiber with fresh fruits and vegetables, and over-the-counter fiber supplements such as FiberCon or Metamucil. 5.  Recommend a course of probiotics (such as Culturelle or Natures Bounty). Yogurt also represents a source of probiotics. 6.  You will be notified if culture results are positive. 7.  Follow-up with primary care physician for recheck should symptoms persist.  8.  MiraLAX (over-the-counter laxative) if you fail to respond to an increase in dietary fiber and fluids. Patient Education        Constipation: Care Instructions  Your Care Instructions    Constipation means that you have a hard time passing stools (bowel movements). People pass stools from 3 times a day to once every 3 days. What is normal for you may be different. Constipation may occur with pain in the rectum and cramping. The pain may get worse when you try to pass stools. Sometimes there are small amounts of bright red blood on toilet paper or the surface of stools. This is because of enlarged veins near the rectum (hemorrhoids). A few changes in your diet and lifestyle may help you avoid ongoing constipation. Your doctor may also prescribe medicine to help loosen your stool. Some medicines can cause constipation. These include pain medicines and antidepressants. Tell your doctor about all the medicines you take. Your doctor may want to make a medicine change to ease your symptoms. Follow-up care is a key part of your treatment and safety. Be sure to make and go to all appointments, and call your doctor if you are having problems. It's also a good idea to know your test results and keep a list of the medicines you take.   How can you care for yourself at home? · Drink plenty of fluids, enough so that your urine is light yellow or clear like water. If you have kidney, heart, or liver disease and have to limit fluids, talk with your doctor before you increase the amount of fluids you drink. · Include high-fiber foods in your diet each day. These include fruits, vegetables, beans, and whole grains. · Get at least 30 minutes of exercise on most days of the week. Walking is a good choice. You also may want to do other activities, such as running, swimming, cycling, or playing tennis or team sports. · Take a fiber supplement, such as Citrucel or Metamucil, every day. Read and follow all instructions on the label. · Schedule time each day for a bowel movement. A daily routine may help. Take your time having your bowel movement. · Support your feet with a small step stool when you sit on the toilet. This helps flex your hips and places your pelvis in a squatting position. · Your doctor may recommend an over-the-counter laxative to relieve your constipation. Examples are Milk of Magnesia and MiraLax. Read and follow all instructions on the label. Do not use laxatives on a long-term basis. When should you call for help? Call your doctor now or seek immediate medical care if:    · You have new or worse belly pain.     · You have new or worse nausea or vomiting.     · You have blood in your stools.    Watch closely for changes in your health, and be sure to contact your doctor if:    · Your constipation is getting worse.     · You do not get better as expected. Where can you learn more? Go to http://jay-isidoro.info/. Enter 21 765.954.7233 in the search box to learn more about \"Constipation: Care Instructions. \"  Current as of: June 26, 2019  Content Version: 12.2  © 0810-8020 [employer]. Care instructions adapted under license by [employer] (which disclaims liability or warranty for this information).  If you have questions about a medical condition or this instruction, always ask your healthcare professional. [place] [address] any warranty or liability for your use of this information.

## 2019-09-29 NOTE — ED TRIAGE NOTES
C/O constipation with blood noted in stool. Pt is really unclear of own symptoms. Girlfriend in Triage to Speak for him.

## 2019-09-29 NOTE — ED PROVIDER NOTES
76-year-old male presents for evaluation of abdominal cramping and mucoid stool. Patient saw strings of mucus in the stool on the 18 of this month and was concerned that perhaps he had warm infestation. He was seen here and subsequent stool was sent for ova and parasites which returned negative. Since then patient has not had a normal bowel movement. He did take a laxative with some stool out yesterday and then passed a bit of bloody mucus mucus today. He denies localized abdominal pain, fever, chills, nausea, or vomiting. He has had no recent travel, no sick contacts, no recent antibiotics. Denies prior history of constipation or recurrent diarrhea. He has no known history of inflammatory bowel disease and the family history is negative for same. He has been eating normally.            Past Medical History:   Diagnosis Date    Migraine        Past Surgical History:   Procedure Laterality Date    HX OTHER SURGICAL      tubes placed in ears         Family History:   Problem Relation Age of Onset    Diabetes Other     Hypertension Other        Social History     Socioeconomic History    Marital status: SINGLE     Spouse name: Not on file    Number of children: Not on file    Years of education: Not on file    Highest education level: Not on file   Occupational History    Not on file   Social Needs    Financial resource strain: Not on file    Food insecurity:     Worry: Not on file     Inability: Not on file    Transportation needs:     Medical: Not on file     Non-medical: Not on file   Tobacco Use    Smoking status: Former Smoker     Types: Cigarettes     Last attempt to quit: 10/1/2011     Years since quittin.0    Smokeless tobacco: Never Used   Substance and Sexual Activity    Alcohol use: No    Drug use: No    Sexual activity: Not on file   Lifestyle    Physical activity:     Days per week: Not on file     Minutes per session: Not on file    Stress: Not on file   Relationships    Social connections:     Talks on phone: Not on file     Gets together: Not on file     Attends [employer] service: Not on file     Active member of club or organization: Not on file     Attends meetings of clubs or organizations: Not on file     Relationship status: Not on file    Intimate partner violence:     Fear of current or ex partner: Not on file     Emotionally abused: Not on file     Physically abused: Not on file     Forced sexual activity: Not on file   Other Topics Concern    Not on file   Social History Narrative    Not on file         ALLERGIES: Patient has no known allergies. Review of Systems   Constitutional: Negative for fever. Gastrointestinal: Positive for constipation. Vitals:    09/29/19 1659   BP: 114/66   Pulse: (!) 53   Resp: 16   Temp: 98.3 °F (36.8 °C)   SpO2: 100%   Weight: 79.4 kg (175 lb)            Physical Exam   Constitutional: He is oriented to person, place, and time. He appears well-developed and well-nourished. No distress. HENT:   Head: Normocephalic and atraumatic. Eyes: No scleral icterus. Cardiovascular: Normal rate and regular rhythm. Pulmonary/Chest: Effort normal and breath sounds normal.   Abdominal: He exhibits no mass. There is no rebound and no guarding. Hypoactive bowel sounds. Abdomen nondistended nontender   Neurological: He is alert and oriented to person, place, and time. Skin: Skin is warm and dry. Psychiatric: He has a normal mood and affect. Nursing note and vitals reviewed. Acute abdominal series reviewed per myself with moderate stool burden no obstruction, free air. A few scattered small bowel air-fluid levels are noted. Impression: nonspecific gas pattern.     Recent Results (from the past 12 hour(s))   CBC WITH AUTOMATED DIFF    Collection Time: 09/29/19  5:55 PM   Result Value Ref Range    WBC 3.7 (L) 4.6 - 13.2 K/uL    RBC 4.62 (L) 4.70 - 5.50 M/uL    HGB 14.9 13.0 - 16.0 g/dL    HCT 42.3 36.0 - 48.0 %    MCV 91.6 74.0 - 97.0 FL    MCH 32.3 24.0 - 34.0 PG    MCHC 35.2 31.0 - 37.0 g/dL    RDW 11.6 11.6 - 14.5 %    PLATELET 996 838 - 608 K/uL    MPV 9.2 9.2 - 11.8 FL    NEUTROPHILS 46 40 - 73 %    LYMPHOCYTES 40 21 - 52 %    MONOCYTES 11 (H) 3 - 10 %    EOSINOPHILS 2 0 - 5 %    BASOPHILS 1 0 - 2 %    ABS. NEUTROPHILS 1.7 (L) 1.8 - 8.0 K/UL    ABS. LYMPHOCYTES 1.5 0.9 - 3.6 K/UL    ABS. MONOCYTES 0.4 0.05 - 1.2 K/UL    ABS. EOSINOPHILS 0.1 0.0 - 0.4 K/UL    ABS. BASOPHILS 0.0 0.0 - 0.1 K/UL    DF AUTOMATED     METABOLIC PANEL, BASIC    Collection Time: 09/29/19  5:55 PM   Result Value Ref Range    Sodium 140 136 - 145 mmol/L    Potassium 3.9 3.5 - 5.5 mmol/L    Chloride 105 100 - 111 mmol/L    CO2 29 21 - 32 mmol/L    Anion gap 6 3.0 - 18 mmol/L    Glucose 85 74 - 99 mg/dL    BUN 14 7.0 - 18 MG/DL    Creatinine 1.19 0.6 - 1.3 MG/DL    BUN/Creatinine ratio 12 12 - 20      GFR est AA >60 >60 ml/min/1.73m2    GFR est non-AA >60 >60 ml/min/1.73m2    Calcium 9.3 8.5 - 10.1 MG/DL       MDM  Number of Diagnoses or Management Options  Diagnosis management comments: Impression: Constipation passage of mucoid stool tonight. Patient brings in a specimen with him and on visual inspection this is clearly mucus material.  I do not see any evidence that this represents parasitic infestation (worms). There is a little bit of bloody show within the mucus. Stool was sent again for O&P and routine culture. Screening labs obtained. Abdominal series ordered to evaluate stool burden. White blood cell count is in the low normal range. Differential is unremarkable without an excess eosinophil count. Parasitic infection unlikely. Procedures    Diagnosis:   1. Increased mucus in stool    2. Constipation, unspecified constipation type      1. Preliminary reading of abdominal x-ray demonstrates a nonspecific bowel gas pattern without blockage.   2.  Routine labs are within normal limits and white blood cell count is normal.  3.  Stool was again sent for culture, both bacterial pathogens as well as parasites. 4.  Recommend increasing dietary fiber with fresh fruits and vegetables, and over-the-counter fiber supplements such as FiberCon or Metamucil. 5.  Recommend a course of probiotics (such as Culturelle or Natures Bounty). Yogurt also represents a source of probiotics. 6.  You will be notified if culture results are positive. 7.  Follow-up with primary care physician for recheck should symptoms persist.  8.  MiraLAX (over-the-counter laxative) if you fail to respond to an increase in dietary fiber and fluids. Disposition: home    Follow-up Information     Follow up With Specialties Details Why Contact Info    Loren Carey MD Family Practice In 1 week As needed, for recheck of ongoing symptoms [address] [phone] [facility] EMERGENCY DEPT Emergency Medicine  If symptoms worsen [address] [facility]  [phone]          Patient's Medications   Start Taking    No medications on file   Continue Taking    ASPIRIN-ACETAMINOPHEN-CAFFEINE (EXCEDRIN ES) 250-250-65 MG PER TABLET    Take 1 Tab by mouth. FLUTICASONE (FLONASE) 50 MCG/ACTUATION NASAL SPRAY    2 Sprays by Both Nostrils route daily. METHYLPREDNISOLONE (MEDROL DOSEPACK) 4 MG TABLET    Take by mouth per directions on pack. Take with food. SUMATRIPTAN (IMITREX) 100 MG TABLET        TOPIRAMATE (TOPAMAX) 25 MG TABLET    Take 25 mg by mouth daily at bedtime for one week then increase to twice daily.   Indications: Migraine Prevention   These Medications have changed    No medications on file   Stop Taking    No medications on file

## 2019-09-29 NOTE — ED NOTES
Kaylin Shepherd is a 26 y.o. male that was discharged in good condition. The patients diagnosis, condition and treatment were explained to  patient and aftercare instructions were given. The patient verbalized understanding. Patient armband removed and shredded. Lisa Guidry

## 2019-10-02 LAB
O+P STL CONC: NORMAL
O+P STL MICRO: NORMAL

## 2019-10-03 LAB
BACTERIA SPEC CULT: NORMAL
SERVICE CMNT-IMP: NORMAL

## 2019-11-12 ENCOUNTER — OFFICE VISIT (OUTPATIENT)
Dept: NEUROLOGY | Age: 29
End: 2019-11-12

## 2019-11-12 VITALS
TEMPERATURE: 98 F | OXYGEN SATURATION: 97 % | RESPIRATION RATE: 16 BRPM | WEIGHT: 185 LBS | DIASTOLIC BLOOD PRESSURE: 62 MMHG | HEART RATE: 74 BPM | HEIGHT: 66 IN | SYSTOLIC BLOOD PRESSURE: 98 MMHG | BODY MASS INDEX: 29.73 KG/M2

## 2019-11-12 DIAGNOSIS — G43.019 INTRACTABLE MIGRAINE WITHOUT AURA AND WITHOUT STATUS MIGRAINOSUS: Primary | ICD-10-CM

## 2019-11-12 RX ORDER — SUMATRIPTAN 100 MG/1
TABLET, FILM COATED ORAL
Qty: 9 TAB | Refills: 5 | Status: SHIPPED | OUTPATIENT
Start: 2019-11-12

## 2019-11-12 NOTE — PROGRESS NOTES
[facility]  [address] [facility], [address], [place], [address]  [address] [place], [address]  Office:  [phone]  Fax: [phone]  Chief Complaint   Patient presents with    Follow-up    Migraine       HPI: Kamaljit Cabral is a 26-year-old male who presents in follow-up for chronic migraines. He was seen previously at another practice but transferred due to insurance. He started experiencing headaches about 18 months ago. They occur spontaneously. They are migratory, sometimes bifrontal, bitemporal, and others occipital.  The intensity ranges from 6-10 out of 10 when they occur. He reports that his headaches can be infrequent but when they occur they last for a long time. The last for weeks every few months. In the past he has tried Inderal for migraine prevention which he did not tolerate, as well as amitriptyline which he did not tolerate. He does not sleep well at night. His headaches are described as pressure in the head which can be located in different areas. Marta Ask He occasionally has blurry vision with headaches. He was on Topamax in the past at 50 mg twice daily which he stopped. At the last visit it was noted that he thought it may have caused him constipation. He tried Aimovig for 1 dose but he does not think it worked and is not even sure if he got the medication. He has been advised about analgesic rebound headache before. At his last visit he had an ongoing headache and was given a Medrol Dosepak. He presents today in follow-up. He reports only having had one headache since the last visit and it was not as bad as usual.  Currently has a little pressure in the back of his head. He stopped the Topamax because he did not think it was helping. The headache he had was while on Topamax. Also he notes feeling a pressure of the eyes. That feeling resolved and his vision is normal now off the Topamax.   He notes that he believes he had the pressure feeling of the eyes went on Topamax in the past and he went to his eye doctor and reports that he was told his eyes were okay and the pressure was not elevated. At any rate, he did not find the Topamax to be helpful anyway. He had a Medrol pack after the last visit and the headache stopped a few days later. He has taken a few doses of the ibuprofen in the meantime. He took the Imitrex at the onset of a headache and felt some wooziness afterwards but was not sure if it was something he ate; reports that he previously tolerated the medication at that dose. Past Medical History:   Diagnosis Date    Migraine        Past Surgical History:   Procedure Laterality Date    HX OTHER SURGICAL      tubes placed in ears       Current Outpatient Medications   Medication Sig Dispense Refill    SUMAtriptan (IMITREX) 100 mg tablet Take one tab by mouth as needed for migraine. If needed may repeat the dose x 1 after 2 hours. Do not take more than 2 pills in 24 hours. 9 Tab 5    erenumab-aooe (AIMOVIG AUTOINJECTOR) 70 mg/mL injection 1 mL by SubCUTAneous route every thirty (30) days. Indications: Migraine Prevention 1 Each 5    aspirin-acetaminophen-caffeine (EXCEDRIN ES) 250-250-65 mg per tablet Take 1 Tab by mouth.  fluticasone (FLONASE) 50 mcg/actuation nasal spray 2 Sprays by Both Nostrils route daily.  1 Bottle 0        No Known Allergies    Social History     Tobacco Use    Smoking status: Former Smoker     Types: Cigarettes     Last attempt to quit: 10/1/2011     Years since quittin.1    Smokeless tobacco: Never Used   Substance Use Topics    Alcohol use: No    Drug use: No       Family History   Problem Relation Age of Onset    Diabetes Other     Hypertension Other        Review of Systems:  GENERAL: Denies fever or fatigue  CARDIAC: No CP or SOB  PULMONARY: No cough or SOB  MUSCULOSKELETAL: No new joint pain  NEURO: SEE HPI    Physical Examination:  Visit Vitals  BP 98/62 (BP 1 Location: Left arm, BP Patient Position: Sitting)   Pulse 74   Temp 98 °F (36.7 °C) (Oral)   Resp 16   Ht 5' 6\" (1.676 m)   Wt 83.9 kg (185 lb)   SpO2 97%   BMI 29.86 kg/m²       Alert, in NAD. Heart is regular. Oriented x3. Fund of knowledge is adequate. Speech is fluent and memory appears intact. Speech clear. EOMs are full, PERRL, VFFTC. No nystagmus. No facial asymmetry. Tongue midline. Strength and tone are normal. No drift. Fine finger movements symmetrical. FNF intact. DTRs +2, gait symmetric and steady. Impression/Plan: This is a 27-year-old right-handed male who presents in follow-up for migraine headaches. His headaches are not frequent but when he experiences a headache it lasts for an extended period of time, even weeks. He reports that he has stopped the Topamax because he did not find it helpful and also secondary to perceived side effect of eye pressure. We discussed if he had any eye pressure sensation or changes with vision with the Topamax then to remain off the Topamax. He reports that this sensation is gone in his vision is back to baseline but encouraged him to follow up with ophthalmology, he's been there last about a year ago. He has previously failed Inderal and amitriptyline secondary to side effects. He is not sure how well the Rulon Pulling helped him last time but he at this point would like to try this again. We will proceed with this for prevention due to the severity and extended time that he experiences a headache when they do occur. Discussed potential side effects of the medication. Discussed with him and his wife who was on the phone that he can make a call to the office and come in to have the medication administered for the first time with one of the nurses. Follow up in a few weeks after receiving Aimovig, around mid December. Diagnoses and all orders for this visit:    1.  Intractable migraine without aura and without status migrainosus  -     SUMAtriptan (IMITREX) 100 mg tablet; Take one tab by mouth as needed for migraine. If needed may repeat the dose x 1 after 2 hours. Do not take more than 2 pills in 24 hours. -     erenumab-aooe (AIMOVIG AUTOINJECTOR) 70 mg/mL injection; 1 mL by SubCUTAneous route every thirty (30) days. Indications: Migraine Prevention      Total time 30 minutes with 25 minutes spent in counseling. Signed By: Guillermo Sweet NP      This note will not be viewable in [address] PLEASE NOTE:   Portions of this document may have been produced using voice recognition software. Unrecognized errors in transcription may be present.

## 2019-12-24 ENCOUNTER — HOSPITAL ENCOUNTER (EMERGENCY)
Age: 29
Discharge: HOME | End: 2019-12-24
Attending: EMERGENCY MEDICINE
Payer: COMMERCIAL

## 2019-12-24 VITALS
TEMPERATURE: 98.4 F | DIASTOLIC BLOOD PRESSURE: 69 MMHG | SYSTOLIC BLOOD PRESSURE: 115 MMHG | HEIGHT: 66 IN | HEART RATE: 64 BPM | WEIGHT: 165 LBS | BODY MASS INDEX: 26.52 KG/M2 | RESPIRATION RATE: 16 BRPM | OXYGEN SATURATION: 100 %

## 2019-12-24 DIAGNOSIS — K52.9 COLITIS: Primary | ICD-10-CM

## 2019-12-24 DIAGNOSIS — K62.5 RECTAL BLEEDING: ICD-10-CM

## 2019-12-24 LAB
ALBUMIN SERPL-MCNC: 4.1 G/DL (ref 3.4–5)
ALBUMIN/GLOB SERPL: 1 {RATIO} (ref 0.8–1.7)
ALP SERPL-CCNC: 99 U/L (ref 45–117)
ALT SERPL-CCNC: 23 U/L (ref 16–61)
ANION GAP SERPL CALC-SCNC: 6 MMOL/L (ref 3–18)
AST SERPL-CCNC: 23 U/L (ref 10–38)
BASOPHILS # BLD: 0 K/UL (ref 0–0.1)
BASOPHILS NFR BLD: 0 % (ref 0–2)
BILIRUB SERPL-MCNC: 0.5 MG/DL (ref 0.2–1)
BUN SERPL-MCNC: 11 MG/DL (ref 7–18)
BUN/CREAT SERPL: 11 (ref 12–20)
CALCIUM SERPL-MCNC: 8.9 MG/DL (ref 8.5–10.1)
CHLORIDE SERPL-SCNC: 106 MMOL/L (ref 100–111)
CO2 SERPL-SCNC: 29 MMOL/L (ref 21–32)
CREAT SERPL-MCNC: 1.04 MG/DL (ref 0.6–1.3)
DIFFERENTIAL METHOD BLD: ABNORMAL
EOSINOPHIL # BLD: 0.1 K/UL (ref 0–0.4)
EOSINOPHIL NFR BLD: 1 % (ref 0–5)
ERYTHROCYTE [DISTWIDTH] IN BLOOD BY AUTOMATED COUNT: 11.4 % (ref 11.6–14.5)
GLOBULIN SER CALC-MCNC: 4 G/DL (ref 2–4)
GLUCOSE SERPL-MCNC: 80 MG/DL (ref 74–99)
HCT VFR BLD AUTO: 40.8 % (ref 36–48)
HEMOCCULT STL QL: NEGATIVE
HGB BLD-MCNC: 14.3 G/DL (ref 13–16)
LYMPHOCYTES # BLD: 1.4 K/UL (ref 0.9–3.6)
LYMPHOCYTES NFR BLD: 26 % (ref 21–52)
MCH RBC QN AUTO: 32 PG (ref 24–34)
MCHC RBC AUTO-ENTMCNC: 35 G/DL (ref 31–37)
MCV RBC AUTO: 91.3 FL (ref 74–97)
MONOCYTES # BLD: 0.4 K/UL (ref 0.05–1.2)
MONOCYTES NFR BLD: 7 % (ref 3–10)
NEUTS SEG # BLD: 3.5 K/UL (ref 1.8–8)
NEUTS SEG NFR BLD: 66 % (ref 40–73)
PLATELET # BLD AUTO: 248 K/UL (ref 135–420)
PMV BLD AUTO: 8.9 FL (ref 9.2–11.8)
POTASSIUM SERPL-SCNC: 3.5 MMOL/L (ref 3.5–5.5)
PROT SERPL-MCNC: 8.1 G/DL (ref 6.4–8.2)
RBC # BLD AUTO: 4.47 M/UL (ref 4.7–5.5)
SODIUM SERPL-SCNC: 141 MMOL/L (ref 136–145)
WBC # BLD AUTO: 5.3 K/UL (ref 4.6–13.2)

## 2019-12-24 PROCEDURE — 74011250636 HC RX REV CODE- 250/636: Performed by: EMERGENCY MEDICINE

## 2019-12-24 PROCEDURE — 85025 COMPLETE CBC W/AUTO DIFF WBC: CPT

## 2019-12-24 PROCEDURE — 74011250637 HC RX REV CODE- 250/637: Performed by: EMERGENCY MEDICINE

## 2019-12-24 PROCEDURE — 80053 COMPREHEN METABOLIC PANEL: CPT

## 2019-12-24 PROCEDURE — 99283 EMERGENCY DEPT VISIT LOW MDM: CPT

## 2019-12-24 PROCEDURE — 82270 OCCULT BLOOD FECES: CPT

## 2019-12-24 PROCEDURE — 96360 HYDRATION IV INFUSION INIT: CPT

## 2019-12-24 RX ORDER — NAPROXEN 500 MG/1
500 TABLET ORAL 2 TIMES DAILY WITH MEALS
Qty: 10 TAB | Refills: 0 | Status: SHIPPED | OUTPATIENT
Start: 2019-12-24 | End: 2019-12-29

## 2019-12-24 RX ORDER — CIPROFLOXACIN 500 MG/1
500 TABLET ORAL 2 TIMES DAILY
Qty: 14 TAB | Refills: 0 | Status: SHIPPED | OUTPATIENT
Start: 2019-12-24 | End: 2019-12-31

## 2019-12-24 RX ORDER — CIPROFLOXACIN 500 MG/1
500 TABLET ORAL 2 TIMES DAILY
Qty: 14 TAB | Refills: 0 | Status: SHIPPED | OUTPATIENT
Start: 2019-12-24 | End: 2019-12-24

## 2019-12-24 RX ORDER — METRONIDAZOLE 500 MG/1
500 TABLET ORAL 2 TIMES DAILY
Qty: 14 TAB | Refills: 0 | Status: SHIPPED | OUTPATIENT
Start: 2019-12-24 | End: 2019-12-31

## 2019-12-24 RX ORDER — CIPROFLOXACIN 500 MG/1
500 TABLET ORAL
Status: COMPLETED | OUTPATIENT
Start: 2019-12-24 | End: 2019-12-24

## 2019-12-24 RX ORDER — METRONIDAZOLE 500 MG/1
500 TABLET ORAL 2 TIMES DAILY
Qty: 14 TAB | Refills: 0 | Status: SHIPPED | OUTPATIENT
Start: 2019-12-24 | End: 2019-12-24

## 2019-12-24 RX ORDER — METRONIDAZOLE 500 MG/1
500 TABLET ORAL
Status: COMPLETED | OUTPATIENT
Start: 2019-12-24 | End: 2019-12-24

## 2019-12-24 RX ADMIN — CIPROFLOXACIN HYDROCHLORIDE 500 MG: 500 TABLET, FILM COATED ORAL at 19:02

## 2019-12-24 RX ADMIN — SODIUM CHLORIDE 1000 ML: 900 INJECTION, SOLUTION INTRAVENOUS at 18:15

## 2019-12-24 RX ADMIN — METRONIDAZOLE 500 MG: 500 TABLET ORAL at 19:02

## 2019-12-24 NOTE — ED TRIAGE NOTES
Patient c/o upper abdominal pain and left side abdominal pain since yesterday. Patient states taking Pepto-Bismol for stomach concerns. He states bright red rectal bleeding while having bowel movement today. Denies having bleeding yesterday. States recent issues with constipated stools.

## 2019-12-24 NOTE — DISCHARGE INSTRUCTIONS
Patient Education     Colitis: Care Instructions  Your Care Instructions  Colitis is the medical term for swelling (inflammation) of the intestine. It can be caused by different things, such as an infection or loss of blood flow in the intestine. Other causes are problems like Crohn's disease or ulcerative colitis. Symptoms may include fever, diarrhea that may be bloody, or belly pain. Sometimes symptoms go away without treatment. But you may need treatment or more tests, such as blood tests or a stool test. Or you may need imaging tests like a CT scan or a colonoscopy. In some cases, the doctor may want to test a sample of tissue from the intestine. This test is called a biopsy. The doctor has checked you carefully, but problems can develop later. If you notice any problems or new symptoms, get medical treatment right away. Follow-up care is a key part of your treatment and safety. Be sure to make and go to all appointments, and call your doctor if you are having problems. It's also a good idea to know your test results and keep a list of the medicines you take. How can you care for yourself at home? · Rest until you feel better. · Your doctor may recommend that you eat bland foods. These include rice, dry toast or crackers, bananas, and applesauce. · To prevent dehydration, drink plenty of fluids. Choose water and other caffeine-free clear liquids until you feel better. If you have kidney, heart, or liver disease and have to limit fluids, talk with your doctor before you increase the amount of fluids you drink. · Be safe with medicines. Take your medicines exactly as prescribed. Call your doctor if you think you are having a problem with your medicine. You will get more details on the specific medicines your doctor prescribes. When should you call for help? Call 911 anytime you think you may need emergency care. For example, call if:  · You passed out (lost consciousness).   · You vomit blood or what looks like coffee grounds. · Your stools are maroon or very bloody. Call your doctor now or seek immediate medical care if:  · You have new or worse pain. · You have a new or higher fever. · You have new or worse symptoms. · You cannot keep fluids or medicines down. Watch closely for changes in your health, and be sure to contact your doctor if:  · You do not get better as expected. Where can you learn more? Go to [employer]  Enter [id number] in the search box to learn more about \"Colitis: Care Instructions. \"   © 9728-7172 [employer]. Care instructions adapted under license by [facility] (which disclaims liability or warranty for this information). This care instruction is for use with your licensed healthcare professional. If you have questions about a medical condition or this instruction, always ask your healthcare professional. [place] [address] any warranty or liability for your use of this information.   Content Version: 35.3.102072; Current as of: November 20, 2015

## 2019-12-24 NOTE — ED PROVIDER NOTES
EMERGENCY DEPARTMENT HISTORY AND PHYSICAL EXAM    6:07 PM      Date: 12/24/2019  Patient Name: José Manuel Mckenna    History of Presenting Illness     Chief Complaint   Patient presents with    Rectal Bleeding    Abdominal Pain         History Provided By: Patient  Location/Duration/Severity/Modifying factors   Patient is a 24-year-old male without significant medical history the presents emergency department complaint of abdominal cramping with diarrhea followed by mucus and blood in his stool x2 today. Patient had a problem with mucus in his stool approximately 3 months ago but it improved without any problems and eating well and able to work without any issues. Patient yesterday had some chicken and beef stew and start having some abdominal cramping and had several episodes of diarrhea. Patient was given some Pepto-Bismol and this morning woke up and had the urge to go to the bathroom and had mucus and some red blood. Several hours later the patient had another episode of the same. Patient has been worried about that most of the day and has mild cramping in the left lower quadrant. Patient denies any fevers or chills or history of inflammatory bowel disease. Patient's grandmother however did have chronic colitis and eventually colon cancer. Patient has not followed with his primary doctor or GI doctor since this evaluation and September. Patient denies any nausea or vomiting. Patient denies any weight loss or weight gain. Patient works as a  and is not a smoker or drinker.           PCP: Patti Suero MD    Current Facility-Administered Medications   Medication Dose Route Frequency Provider Last Rate Last Dose    sodium chloride 0.9 % bolus infusion 1,000 mL  1,000 mL IntraVENous ONCE Katherine BRICE MD 1,000 mL/hr at 12/24/19 1815 1,000 mL at 12/24/19 1815     Current Outpatient Medications   Medication Sig Dispense Refill    Lactobac no.51/Bifidobact no.4 (UP4 PROBIOTICS ULTRA PO) Take  by mouth.  ciprofloxacin HCl (CIPRO) 500 mg tablet Take 1 Tab by mouth two (2) times a day for 7 days. 14 Tab 0    metroNIDAZOLE (FLAGYL) 500 mg tablet Take 1 Tab by mouth two (2) times a day for 7 days. 14 Tab 0    SUMAtriptan (IMITREX) 100 mg tablet Take one tab by mouth as needed for migraine. If needed may repeat the dose x 1 after 2 hours. Do not take more than 2 pills in 24 hours. 9 Tab 5    erenumab-aooe (AIMOVIG AUTOINJECTOR) 70 mg/mL injection 1 mL by SubCUTAneous route every thirty (30) days. Indications: Migraine Prevention 1 Each 5    fluticasone (FLONASE) 50 mcg/actuation nasal spray 2 Sprays by Both Nostrils route daily. 1 Bottle 0       Past History     Past Medical History:  Past Medical History:   Diagnosis Date    Constipation     Migraine     Worms in stool        Past Surgical History:  Past Surgical History:   Procedure Laterality Date    HX OTHER SURGICAL      tubes placed in ears       Family History:  Family History   Problem Relation Age of Onset    Diabetes Other     Hypertension Other        Social History:  Social History     Tobacco Use    Smoking status: Former Smoker     Types: Cigarettes     Last attempt to quit: 10/1/2011     Years since quittin.2    Smokeless tobacco: Never Used   Substance Use Topics    Alcohol use: No    Drug use: No       Allergies:  No Known Allergies      Review of Systems       Review of Systems   Constitutional: Negative for activity change, fatigue and fever. HENT: Negative for congestion and rhinorrhea. Eyes: Negative for visual disturbance. Respiratory: Negative for shortness of breath. Cardiovascular: Negative for chest pain and palpitations. Gastrointestinal: Positive for abdominal pain and blood in stool. Negative for diarrhea, nausea and vomiting. Genitourinary: Negative for dysuria and hematuria. Musculoskeletal: Negative for back pain. Skin: Negative for rash.    Allergic/Immunologic: Negative for immunocompromised state. Neurological: Negative for dizziness, weakness and light-headedness. Physical Exam     Visit Vitals  /69 (BP 1 Location: Left arm, BP Patient Position: At rest)   Pulse 64   Temp 98.4 °F (36.9 °C)   Resp 16   Ht 5' 6\" (1.676 m)   Wt 74.8 kg (165 lb)   SpO2 100%   BMI 26.63 kg/m²         Physical Exam  Vitals signs and nursing note reviewed. Constitutional:       General: He is not in acute distress. Appearance: He is well-developed. HENT:      Head: Normocephalic and atraumatic. Right Ear: External ear normal.      Left Ear: External ear normal.      Nose: Nose normal.   Eyes:      General: No scleral icterus. Conjunctiva/sclera: Conjunctivae normal.      Pupils: Pupils are equal, round, and reactive to light. Neck:      Musculoskeletal: Normal range of motion and neck supple. Thyroid: No thyromegaly. Vascular: No JVD. Trachea: No tracheal deviation. Cardiovascular:      Rate and Rhythm: Normal rate and regular rhythm. Heart sounds: Normal heart sounds. No murmur. No friction rub. No gallop. Pulmonary:      Effort: Pulmonary effort is normal.      Breath sounds: Normal breath sounds. Chest:      Chest wall: No tenderness. Abdominal:      General: Bowel sounds are normal. There is no distension. Palpations: Abdomen is soft. Tenderness: There is tenderness in the left lower quadrant. There is no guarding or rebound. Comments: Mild left lower quadrant tenderness without guarding   Genitourinary:     Rectum: Normal.      Comments: No external hemorrhoids noted, possible internal hemorrhoids, no active bleeding noted in my exam, no lesions no masses  Musculoskeletal: Normal range of motion. General: No tenderness. Lymphadenopathy:      Cervical: No cervical adenopathy. Skin:     General: Skin is warm and dry. Neurological:      Mental Status: He is alert and oriented to person, place, and time. Cranial Nerves: No cranial nerve deficit. Coordination: Coordination normal.      Comments: No sensory loss, Gait normal, Motor 5/5   Psychiatric:         Behavior: Behavior normal.         Thought Content: Thought content normal.         Judgment: Judgment normal.           Diagnostic Study Results     Labs -  Recent Results (from the past 12 hour(s))   CBC WITH AUTOMATED DIFF    Collection Time: 12/24/19  6:12 PM   Result Value Ref Range    WBC 5.3 4.6 - 13.2 K/uL    RBC 4.47 (L) 4.70 - 5.50 M/uL    HGB 14.3 13.0 - 16.0 g/dL    HCT 40.8 36.0 - 48.0 %    MCV 91.3 74.0 - 97.0 FL    MCH 32.0 24.0 - 34.0 PG    MCHC 35.0 31.0 - 37.0 g/dL    RDW 11.4 (L) 11.6 - 14.5 %    PLATELET 777 544 - 107 K/uL    MPV 8.9 (L) 9.2 - 11.8 FL    NEUTROPHILS 66 40 - 73 %    LYMPHOCYTES 26 21 - 52 %    MONOCYTES 7 3 - 10 %    EOSINOPHILS 1 0 - 5 %    BASOPHILS 0 0 - 2 %    ABS. NEUTROPHILS 3.5 1.8 - 8.0 K/UL    ABS. LYMPHOCYTES 1.4 0.9 - 3.6 K/UL    ABS. MONOCYTES 0.4 0.05 - 1.2 K/UL    ABS. EOSINOPHILS 0.1 0.0 - 0.4 K/UL    ABS. BASOPHILS 0.0 0.0 - 0.1 K/UL    DF AUTOMATED     METABOLIC PANEL, COMPREHENSIVE    Collection Time: 12/24/19  6:12 PM   Result Value Ref Range    Sodium 141 136 - 145 mmol/L    Potassium 3.5 3.5 - 5.5 mmol/L    Chloride 106 100 - 111 mmol/L    CO2 29 21 - 32 mmol/L    Anion gap 6 3.0 - 18 mmol/L    Glucose 80 74 - 99 mg/dL    BUN 11 7.0 - 18 MG/DL    Creatinine 1.04 0.6 - 1.3 MG/DL    BUN/Creatinine ratio 11 (L) 12 - 20      GFR est AA >60 >60 ml/min/1.73m2    GFR est non-AA >60 >60 ml/min/1.73m2    Calcium 8.9 8.5 - 10.1 MG/DL    Bilirubin, total 0.5 0.2 - 1.0 MG/DL    ALT (SGPT) 23 16 - 61 U/L    AST (SGOT) 23 10 - 38 U/L    Alk.  phosphatase 99 45 - 117 U/L    Protein, total 8.1 6.4 - 8.2 g/dL    Albumin 4.1 3.4 - 5.0 g/dL    Globulin 4.0 2.0 - 4.0 g/dL    A-G Ratio 1.0 0.8 - 1.7     POC FECAL OCCULT BLOOD    Collection Time: 12/24/19  6:18 PM   Result Value Ref Range    Occult blood, stool (POC) NEGATIVE NEG         Radiologic Studies -   No orders to display         Medical Decision Making   I am the first provider for this patient. I reviewed the vital signs, available nursing notes, past medical history, past surgical history, family history and social history. Vital Signs-Reviewed the patient's vital signs. Records Reviewed: Nursing Notes and Old Medical Records (Time of Review: 6:07 PM)    ED Course: Progress Notes, Reevaluation, and Consults:     Patient has no obvious hemorrhoids and no blood in his stool however some pictures of the toilet water and he brought a sample into the ED. Patient's had stool studies done in the past 2 months that were negative. Shalini Brown, DO 6:38 PM    Patient's white count and blood counts are reassuring this is been a recurrent episode. We will start the patient on Cipro and Flagyl and have him follow with GI for an evaluation for endoscopy to see if he has chronic condition causing his recurrent stool issues. Patient is to follow with his primary doctor as well. Patient is to return if at all worsened or concerned. Workup and recommendations were reviewed with the patient and all questions were answered. The patient understands the plan and will proceed with close outpatient care. I have encouraged the patient to return if at all worsened or concerned. Shalini Brown, DO 6:48 PM        Provider Notes (Medical Decision Making):   MDM  Number of Diagnoses or Management Options  Diagnosis management comments: Patient is a 79-year-old male with a history of recurrent GI presentations to the emergency department with mucus in his stool today presents with mucus in stool with blood as well. No other members of the family have the similar symptoms and he did not have any raw seafood over meat. Patient has had similar episodes in the past and has not followed up with GI or primary doctor.   Have concerns that he may she may have chronic or intermittent colitis and may be related to inflammatory bowel disease. I will follow CBC, CMP, hydrate the patient, and if reassuring plan to send the patient home on Cipro/Flagyl and have him follow-up with GI for further care. Do not suspect imaging is needed at this time as his abdomen is soft. Procedures        Diagnosis     Clinical Impression:   1. Colitis    2. Rectal bleeding        Disposition: DC    Follow-up Information     Follow up With Specialties Details Why Contact Info    Kevon Fine MD Gastroenterology, Internal Medicine In 1 week  [address] [facility]  [phone]      Clement Hodges MD [facility] In 3 days  [address] [address] [phone] [address] [facility] EMERGENCY DEPT Emergency Medicine  As needed, If symptoms worsen [address] [facility]  [phone]           Patient's Medications   Start Taking    CIPROFLOXACIN HCL (CIPRO) 500 MG TABLET    Take 1 Tab by mouth two (2) times a day for 7 days. METRONIDAZOLE (FLAGYL) 500 MG TABLET    Take 1 Tab by mouth two (2) times a day for 7 days. Continue Taking    ERENUMAB-AOOE (AIMOVIG AUTOINJECTOR) 70 MG/ML INJECTION    1 mL by SubCUTAneous route every thirty (30) days. Indications: Migraine Prevention    FLUTICASONE (FLONASE) 50 MCG/ACTUATION NASAL SPRAY    2 Sprays by Both Nostrils route daily. LACTOBAC NO.51/BIFIDOBACT NO.4 (UP4 PROBIOTICS ULTRA PO)    Take  by mouth. SUMATRIPTAN (IMITREX) 100 MG TABLET    Take one tab by mouth as needed for migraine. If needed may repeat the dose x 1 after 2 hours. Do not take more than 2 pills in 24 hours. These Medications have changed    No medications on file   Stop Taking    ASPIRIN-ACETAMINOPHEN-CAFFEINE (EXCEDRIN ES) 250-250-65 MG PER TABLET    Take 1 Tab by mouth. Disclaimer: Sections of this note are dictated using utilizing voice recognition software.   Minor typographical errors may be present. If questions arise, please do not hesitate to contact me or call our department.

## 2019-12-25 NOTE — ED NOTES
Jasvir Romano is a 26 y.o. male that was discharged in good condition. The patients diagnosis, condition and treatment were explained to  patient and aftercare instructions were given. The patient verbalized understanding. Patient armband removed and shredded.

## 2020-01-30 ENCOUNTER — OFFICE VISIT (OUTPATIENT)
Dept: CARDIOLOGY CLINIC | Age: 30
End: 2020-01-30

## 2020-01-30 VITALS
BODY MASS INDEX: 28.93 KG/M2 | WEIGHT: 180 LBS | DIASTOLIC BLOOD PRESSURE: 78 MMHG | OXYGEN SATURATION: 98 % | HEIGHT: 66 IN | HEART RATE: 81 BPM | RESPIRATION RATE: 16 BRPM | SYSTOLIC BLOOD PRESSURE: 118 MMHG

## 2020-01-30 DIAGNOSIS — R55 NEAR SYNCOPE: Primary | ICD-10-CM

## 2020-01-30 DIAGNOSIS — I95.0 IDIOPATHIC HYPOTENSION: ICD-10-CM

## 2020-01-30 NOTE — PROGRESS NOTES
Identified pt with two pt identifiers(name and ). Reviewed record in preparation for visit and have obtained necessary documentation. Chief Complaint   Patient presents with    New Patient     low BP refered by patient first        Health Maintenance Due   Topic    Influenza Age 5 to Adult        Coordination of Care Questionnaire:  :   1) Have you been to an emergency room, urgent care, or hospitalized since your last visit? If yes, where when, and reason for visit? yes ,  [facility]      2. Have seen or consulted any other health care provider since your last visit? If yes, where when, and reason for visit? NO      3) Do you have an Advanced Directive/ Living Will in place?  NO        Learning Assessment 2020   PRIMARY LEARNER Patient   HIGHEST LEVEL OF EDUCATION - PRIMARY LEARNER  SOME COLLEGE   BARRIERS PRIMARY LEARNER NONE   CO-LEARNER NAME no   PRIMARY LANGUAGE ENGLISH   LEARNER PREFERENCE PRIMARY LISTENING   ANSWERED BY Patient   RELATIONSHIP SELF        3 most recent PHQ Screens 2020   Little interest or pleasure in doing things Not at all   Feeling down, depressed, irritable, or hopeless Not at all   Total Score PHQ 2 0

## 2020-01-30 NOTE — PROGRESS NOTES
Adiel Lamb    Chief Complaint   Patient presents with    New Patient     low BP refered by patient first       DOROTHY    Adiel Lamb is a 26 y.o. AAM with no known cardiac disease here for evaluation of persistent hypotension, dizziness, family history of heart disease. As you know this is a relatively healthy gentleman with no chronic known medical conditions but has gone to the ER several times in the past couple years. Some have been for noncardiac causes but in this setting he will be told that his blood pressures are lower. He had some type of work-up with hematology but the results of that work-up are somewhat unknown he says this was approximately a year ago. Otherwise not had any cardiac testing. he has been complaining of some dizziness he noticed he gets very lightheaded when he changes positions. Otherwise no CP, no swelling, no thom syncope. He does feel easily winded at times. He works out regularly and hasnt had to stop for exertional symptoms    CV RFs; None    Past Medical History:   Diagnosis Date    Constipation     Migraine     Worms in stool        Past Surgical History:   Procedure Laterality Date    HX OTHER SURGICAL      tubes placed in ears       Current Outpatient Medications   Medication Sig Dispense Refill    Lactobac no.51/Bifidobact no.4 (UP4 PROBIOTICS ULTRA PO) Take  by mouth.  SUMAtriptan (IMITREX) 100 mg tablet Take one tab by mouth as needed for migraine. If needed may repeat the dose x 1 after 2 hours. Do not take more than 2 pills in 24 hours. 9 Tab 5    erenumab-aooe (AIMOVIG AUTOINJECTOR) 70 mg/mL injection 1 mL by SubCUTAneous route every thirty (30) days. Indications: Migraine Prevention 1 Each 5    fluticasone (FLONASE) 50 mcg/actuation nasal spray 2 Sprays by Both Nostrils route daily.  1 Bottle 0       No Known Allergies    Social History     Socioeconomic History    Marital status: SINGLE     Spouse name: Not on file    Number of children: Not on file    Years of education: Not on file    Highest education level: Not on file   Occupational History    Not on file   Social Needs    Financial resource strain: Not on file    Food insecurity:     Worry: Not on file     Inability: Not on file    Transportation needs:     Medical: Not on file     Non-medical: Not on file   Tobacco Use    Smoking status: Former Smoker     Types: Cigarettes     Last attempt to quit: 10/1/2011     Years since quittin.3    Smokeless tobacco: Never Used   Substance and Sexual Activity    Alcohol use: No    Drug use: No    Sexual activity: Not on file   Lifestyle    Physical activity:     Days per week: Not on file     Minutes per session: Not on file    Stress: Not on file   Relationships    Social connections:     Talks on phone: Not on file     Gets together: Not on file     Attends Buddhism service: Not on file     Active member of club or organization: Not on file     Attends meetings of clubs or organizations: Not on file     Relationship status: Not on file    Intimate partner violence:     Fear of current or ex partner: Not on file     Emotionally abused: Not on file     Physically abused: Not on file     Forced sexual activity: Not on file   Other Topics Concern    Not on file   Social History Narrative    Not on file        FH: his grandmother had a heart transplant for CHF (details unknown but sounds nonischemic CMP), several family members-sibling and mom have heart murmur    Review of Systems    14 pt Review of Systems is negative unless otherwise mentioned in the HPI.     Wt Readings from Last 3 Encounters:   20 81.6 kg (180 lb)   19 74.8 kg (165 lb)   19 83.9 kg (185 lb)     Temp Readings from Last 3 Encounters:   19 98.4 °F (36.9 °C)   19 98 °F (36.7 °C) (Oral)   19 98.3 °F (36.8 °C)     BP Readings from Last 3 Encounters:   20 118/78   19 115/69   19 98/62     Pulse Readings from Last 3 Encounters:   01/30/20 81   12/24/19 64   11/12/19 74     Physical Exam:    Visit Vitals  /78 (BP 1 Location: Left arm, BP Patient Position: Sitting)   Pulse 81   Resp 16   Ht 5' 6\" (1.676 m)   Wt 81.6 kg (180 lb)   SpO2 98%   BMI 29.05 kg/m²      Physical Exam  HENT:      Head: Normocephalic and atraumatic. Eyes:      General: No scleral icterus. Pupils: Pupils are equal, round, and reactive to light. Cardiovascular:      Rate and Rhythm: Normal rate and regular rhythm. Heart sounds: Normal heart sounds. No murmur. No friction rub. No gallop. Pulmonary:      Effort: Pulmonary effort is normal. No respiratory distress. Breath sounds: Normal breath sounds. No wheezing or rales. Chest:      Chest wall: No tenderness. Abdominal:      General: Bowel sounds are normal.      Palpations: Abdomen is soft. Skin:     General: Skin is warm and dry. Findings: No rash. Neurological:      Mental Status: He is alert and oriented to person, place, and time. EKG today shows: NSR, normal axis and intervals, no ST segment abnormalities    Lab Results   Component Value Date/Time    WBC 2.7 (L) 01/03/2020 06:26 PM    HGB 14.8 01/03/2020 06:26 PM    HCT 43.5 01/03/2020 06:26 PM    PLATELET 064 13/47/9607 06:26 PM    MCV 94.0 01/03/2020 06:26 PM     Lab Results   Component Value Date/Time    Sodium 136 01/03/2020 06:26 PM    Potassium 4.4 01/03/2020 06:26 PM    Chloride 103 01/03/2020 06:26 PM    CO2 27 01/03/2020 06:26 PM    Anion gap 6 01/03/2020 06:26 PM    Glucose 79 01/03/2020 06:26 PM    BUN 12 01/03/2020 06:26 PM    Creatinine 1.1 01/03/2020 06:26 PM    BUN/Creatinine ratio 11 (L) 12/24/2019 06:12 PM    GFR est AA >60 01/03/2020 06:26 PM    GFR est non-AA >60 01/03/2020 06:26 PM    Calcium 9.4 01/03/2020 06:26 PM    Bilirubin, total 0.6 01/03/2020 06:26 PM    AST (SGOT) 23 01/03/2020 06:26 PM    Alk.  phosphatase 89 01/03/2020 06:26 PM    Protein, total 8.3 (H) 01/03/2020 06:26 PM Albumin 4.5 01/03/2020 06:26 PM    Globulin 4.0 12/24/2019 06:12 PM    A-G Ratio 1.0 12/24/2019 06:12 PM    ALT (SGPT) 26 01/03/2020 06:26 PM     Lab Results   Component Value Date/Time    TSH 2.990 01/03/2020 06:26 PM         Impression and Plan:  Dagoberto Meier is a 26 y.o. with:    1.) Hypotension with dizziness  2.) NSR, some sinus bradycardia, unlikely to contribute to #1  3.) Sporadic SOB, doubt significant cardiac etiology    1.) Echocardigram, if negative can follow up PRN    >60 mins spent reviewing records, recent labs etc  Dont see any obvious cause of autonomic dysfunction  Will r/o structural heart disease based on his recurrent symptoms     Thank you for allowing me to participate in the care of your patient, please do not hesitate to call with questions or concerns.     [address],    [facility] [place], DO

## 2020-01-30 NOTE — PATIENT INSTRUCTIONS
Echo  
Follow up as needed If you have not heard from the central scheduler to schedule your testing in 48 hours, please call [phone].

## 2020-02-12 ENCOUNTER — HOSPITAL ENCOUNTER (OUTPATIENT)
Dept: NON INVASIVE DIAGNOSTICS | Age: 30
Discharge: HOME | End: 2020-02-12
Attending: INTERNAL MEDICINE
Payer: COMMERCIAL

## 2020-02-12 VITALS
WEIGHT: 180 LBS | HEIGHT: 66 IN | DIASTOLIC BLOOD PRESSURE: 78 MMHG | BODY MASS INDEX: 28.93 KG/M2 | SYSTOLIC BLOOD PRESSURE: 118 MMHG

## 2020-02-12 DIAGNOSIS — I95.0 IDIOPATHIC HYPOTENSION: ICD-10-CM

## 2020-02-12 DIAGNOSIS — R55 NEAR SYNCOPE: ICD-10-CM

## 2020-02-12 PROCEDURE — 93306 TTE W/DOPPLER COMPLETE: CPT

## 2020-02-13 LAB
ECHO AO ROOT DIAM: 2.96 CM
ECHO LA AREA 4C: 15.5 CM2
ECHO LA VOL 2C: 42.72 ML (ref 18–58)
ECHO LA VOL 4C: 36.35 ML (ref 18–58)
ECHO LA VOL BP: 44.39 ML (ref 18–58)
ECHO LA VOL/BSA BIPLANE: 23.21 ML/M2 (ref 16–28)
ECHO LA VOLUME INDEX A2C: 22.34 ML/M2 (ref 16–28)
ECHO LA VOLUME INDEX A4C: 19.01 ML/M2 (ref 16–28)
ECHO LV E' LATERAL VELOCITY: 10.84 CM/S
ECHO LV E' SEPTAL VELOCITY: 9.45 CM/S
ECHO LV EDV TEICHHOLZ: 0.71 ML
ECHO LV ESV TEICHHOLZ: 0.29 ML
ECHO LV INTERNAL DIMENSION DIASTOLIC: 5 CM (ref 4.2–5.9)
ECHO LV INTERNAL DIMENSION SYSTOLIC: 3.42 CM
ECHO LV IVSD: 0.94 CM (ref 0.6–1)
ECHO LV MASS 2D: 189.2 G (ref 88–224)
ECHO LV MASS INDEX 2D: 98.9 G/M2 (ref 49–115)
ECHO LV POSTERIOR WALL DIASTOLIC: 0.9 CM (ref 0.6–1)
ECHO LVOT DIAM: 2.07 CM
ECHO LVOT PEAK GRADIENT: 2.9 MMHG
ECHO LVOT PEAK VELOCITY: 85.35 CM/S
ECHO LVOT VTI: 17.05 CM
ECHO MV A VELOCITY: 34.76 CM/S
ECHO MV E DECELERATION TIME (DT): 248 MS
ECHO MV E VELOCITY: 63.58 CM/S
ECHO MV E/A RATIO: 1.83
ECHO MV E/E' LATERAL: 5.87
ECHO MV E/E' RATIO (AVERAGED): 6.3
ECHO MV E/E' SEPTAL: 6.73
ECHO RV TAPSE: 2.39 CM (ref 1.5–2)
LVFS 2D: 31.58 %
LVOT MG: 1.62 MMHG
LVOT MV: 0.59 CM/S
LVSV (TEICH): 35.98 ML
MV DEC SLOPE: 2.56

## 2020-02-14 NOTE — PROGRESS NOTES
\" per your last note\" Myriam Bryant is a 26 y.o. with:     1.) Hypotension with dizziness  2.) NSR, some sinus bradycardia, unlikely to contribute to #1  3.) Sporadic SOB, doubt significant cardiac etiology     1.) Echocardigram, if negative can follow up PRN     >60 mins spent reviewing records, recent labs etc  Dont see any obvious cause of autonomic dysfunction  Will r/o structural heart disease based on his recurrent symptoms

## 2020-02-21 ENCOUNTER — TELEPHONE (OUTPATIENT)
Dept: CARDIOLOGY CLINIC | Age: 30
End: 2020-02-21

## 2020-02-21 NOTE — TELEPHONE ENCOUNTER
----- Message from Sim Hoffman DO sent at 2/19/2020  8:22 AM EST -----  Please let him know his echo is normal, he can follow up with his PCP (and us PRN)  ----- Message -----  From: Niehsa Mckeon LPN  Sent: 5/65/7085  12:04 PM EST  To: Sim Hoffman DO    \" per your last note\" Socorro Ivey is a 26 y.o. with:     1.) Hypotension with dizziness  2.) NSR, some sinus bradycardia, unlikely to contribute to #1  3.) Sporadic SOB, doubt significant cardiac etiology     1.) Echocardigram, if negative can follow up PRN     >60 mins spent reviewing records, recent labs etc  Dont see any obvious cause of autonomic dysfunction  Will r/o structural heart disease based on his recurrent symptoms

## 2020-03-04 NOTE — TELEPHONE ENCOUNTER
Patient's spouse called, Jeramie Milder permission verified, identity verified with 2 identifiers. Test results shared with patient.

## 2022-03-19 PROBLEM — G43.111 INTRACTABLE MIGRAINE WITH AURA WITH STATUS MIGRAINOSUS: Status: ACTIVE | Noted: 2019-09-12

## 2024-11-28 ENCOUNTER — APPOINTMENT (OUTPATIENT)
Facility: HOSPITAL | Age: 34
End: 2024-11-28
Payer: COMMERCIAL

## 2024-11-28 ENCOUNTER — HOSPITAL ENCOUNTER (EMERGENCY)
Facility: HOSPITAL | Age: 34
Discharge: HOME | End: 2024-11-28
Attending: STUDENT IN AN ORGANIZED HEALTH CARE EDUCATION/TRAINING PROGRAM
Payer: COMMERCIAL

## 2024-11-28 VITALS
BODY MASS INDEX: 30.15 KG/M2 | DIASTOLIC BLOOD PRESSURE: 65 MMHG | TEMPERATURE: 98.1 F | HEART RATE: 66 BPM | WEIGHT: 187.6 LBS | SYSTOLIC BLOOD PRESSURE: 120 MMHG | RESPIRATION RATE: 11 BRPM | OXYGEN SATURATION: 100 % | HEIGHT: 66 IN

## 2024-11-28 DIAGNOSIS — R07.9 CHEST PAIN, UNSPECIFIED TYPE: Primary | ICD-10-CM

## 2024-11-28 DIAGNOSIS — R06.02 SHORTNESS OF BREATH: ICD-10-CM

## 2024-11-28 DIAGNOSIS — B35.6 TINEA CRURIS: ICD-10-CM

## 2024-11-28 LAB
ANION GAP SERPL CALC-SCNC: 5 MMOL/L (ref 3–18)
BASOPHILS # BLD: 0 K/UL (ref 0–0.1)
BASOPHILS NFR BLD: 1 % (ref 0–2)
BUN SERPL-MCNC: 16 MG/DL (ref 7–18)
BUN/CREAT SERPL: 13 (ref 12–20)
CALCIUM SERPL-MCNC: 8.6 MG/DL (ref 8.5–10.1)
CHLORIDE SERPL-SCNC: 108 MMOL/L (ref 100–111)
CO2 SERPL-SCNC: 28 MMOL/L (ref 21–32)
CREAT SERPL-MCNC: 1.26 MG/DL (ref 0.6–1.3)
DIFFERENTIAL METHOD BLD: ABNORMAL
EKG ATRIAL RATE: 59 BPM
EKG DIAGNOSIS: NORMAL
EKG P AXIS: 69 DEGREES
EKG P-R INTERVAL: 166 MS
EKG Q-T INTERVAL: 368 MS
EKG QRS DURATION: 80 MS
EKG QTC CALCULATION (BAZETT): 364 MS
EKG R AXIS: 57 DEGREES
EKG T AXIS: 46 DEGREES
EKG VENTRICULAR RATE: 59 BPM
EOSINOPHIL # BLD: 0.1 K/UL (ref 0–0.4)
EOSINOPHIL NFR BLD: 2 % (ref 0–5)
ERYTHROCYTE [DISTWIDTH] IN BLOOD BY AUTOMATED COUNT: 11.2 % (ref 11.6–14.5)
GLUCOSE SERPL-MCNC: 85 MG/DL (ref 74–99)
HCT VFR BLD AUTO: 37.9 % (ref 36–48)
HGB BLD-MCNC: 13.3 G/DL (ref 13–16)
IMM GRANULOCYTES # BLD AUTO: 0 K/UL (ref 0–0.04)
IMM GRANULOCYTES NFR BLD AUTO: 0 % (ref 0–0.5)
LYMPHOCYTES # BLD: 1.6 K/UL (ref 0.9–3.6)
LYMPHOCYTES NFR BLD: 51 % (ref 21–52)
MAGNESIUM SERPL-MCNC: 2.1 MG/DL (ref 1.6–2.6)
MCH RBC QN AUTO: 32.7 PG (ref 24–34)
MCHC RBC AUTO-ENTMCNC: 35.1 G/DL (ref 31–37)
MCV RBC AUTO: 93.1 FL (ref 78–100)
MONOCYTES # BLD: 0.3 K/UL (ref 0.05–1.2)
MONOCYTES NFR BLD: 11 % (ref 3–10)
NEUTS SEG # BLD: 1.1 K/UL (ref 1.8–8)
NEUTS SEG NFR BLD: 35 % (ref 40–73)
NRBC # BLD: 0 K/UL (ref 0–0.01)
NRBC BLD-RTO: 0 PER 100 WBC
NT PRO BNP: 18 PG/ML (ref 0–450)
PLATELET # BLD AUTO: 228 K/UL (ref 135–420)
PMV BLD AUTO: 8.7 FL (ref 9.2–11.8)
POTASSIUM SERPL-SCNC: 3.9 MMOL/L (ref 3.5–5.5)
RBC # BLD AUTO: 4.07 M/UL (ref 4.35–5.65)
SODIUM SERPL-SCNC: 141 MMOL/L (ref 136–145)
TROPONIN I SERPL HS-MCNC: 5 NG/L (ref 0–78)
TROPONIN I SERPL HS-MCNC: 6 NG/L (ref 0–78)
WBC # BLD AUTO: 3.2 K/UL (ref 4.6–13.2)

## 2024-11-28 PROCEDURE — 6360000002 HC RX W HCPCS: Performed by: STUDENT IN AN ORGANIZED HEALTH CARE EDUCATION/TRAINING PROGRAM

## 2024-11-28 PROCEDURE — 85025 COMPLETE CBC W/AUTO DIFF WBC: CPT

## 2024-11-28 PROCEDURE — 93005 ELECTROCARDIOGRAM TRACING: CPT | Performed by: STUDENT IN AN ORGANIZED HEALTH CARE EDUCATION/TRAINING PROGRAM

## 2024-11-28 PROCEDURE — 83735 ASSAY OF MAGNESIUM: CPT

## 2024-11-28 PROCEDURE — 83880 ASSAY OF NATRIURETIC PEPTIDE: CPT

## 2024-11-28 PROCEDURE — 80048 BASIC METABOLIC PNL TOTAL CA: CPT

## 2024-11-28 PROCEDURE — 71046 X-RAY EXAM CHEST 2 VIEWS: CPT

## 2024-11-28 PROCEDURE — 96374 THER/PROPH/DIAG INJ IV PUSH: CPT

## 2024-11-28 PROCEDURE — 99285 EMERGENCY DEPT VISIT HI MDM: CPT

## 2024-11-28 PROCEDURE — 84484 ASSAY OF TROPONIN QUANT: CPT

## 2024-11-28 PROCEDURE — 93010 ELECTROCARDIOGRAM REPORT: CPT | Performed by: INTERNAL MEDICINE

## 2024-11-28 RX ORDER — KETOROLAC TROMETHAMINE 10 MG/1
10 TABLET, FILM COATED ORAL EVERY 6 HOURS PRN
Qty: 15 TABLET | Refills: 0 | Status: SHIPPED | OUTPATIENT
Start: 2024-11-28

## 2024-11-28 RX ORDER — PRENATAL VIT 91/IRON/FOLIC/DHA 28-975-200
COMBINATION PACKAGE (EA) ORAL NIGHTLY
Qty: 15 G | Refills: 0 | Status: SHIPPED | OUTPATIENT
Start: 2024-11-28

## 2024-11-28 RX ORDER — HYDROCORTISONE 25 MG/G
CREAM TOPICAL 2 TIMES DAILY
Qty: 20 G | Refills: 0 | Status: SHIPPED | OUTPATIENT
Start: 2024-11-28

## 2024-11-28 RX ORDER — KETOROLAC TROMETHAMINE 15 MG/ML
15 INJECTION, SOLUTION INTRAMUSCULAR; INTRAVENOUS ONCE
Status: COMPLETED | OUTPATIENT
Start: 2024-11-28 | End: 2024-11-28

## 2024-11-28 RX ADMIN — KETOROLAC TROMETHAMINE 15 MG: 15 INJECTION, SOLUTION INTRAMUSCULAR; INTRAVENOUS at 06:23

## 2024-11-28 ASSESSMENT — PAIN - FUNCTIONAL ASSESSMENT
PAIN_FUNCTIONAL_ASSESSMENT: 0-10
PAIN_FUNCTIONAL_ASSESSMENT: PREVENTS OR INTERFERES SOME ACTIVE ACTIVITIES AND ADLS

## 2024-11-28 ASSESSMENT — PAIN DESCRIPTION - ORIENTATION: ORIENTATION: LEFT

## 2024-11-28 ASSESSMENT — PAIN DESCRIPTION - PAIN TYPE: TYPE: ACUTE PAIN

## 2024-11-28 ASSESSMENT — LIFESTYLE VARIABLES
HOW OFTEN DO YOU HAVE A DRINK CONTAINING ALCOHOL: NEVER
HOW MANY STANDARD DRINKS CONTAINING ALCOHOL DO YOU HAVE ON A TYPICAL DAY: PATIENT DOES NOT DRINK

## 2024-11-28 ASSESSMENT — PAIN SCALES - GENERAL
PAINLEVEL_OUTOF10: 2
PAINLEVEL_OUTOF10: 2
PAINLEVEL_OUTOF10: 0

## 2024-11-28 ASSESSMENT — PAIN DESCRIPTION - DESCRIPTORS: DESCRIPTORS: PRESSURE

## 2024-11-28 ASSESSMENT — PAIN DESCRIPTION - FREQUENCY: FREQUENCY: INTERMITTENT

## 2024-11-28 ASSESSMENT — PAIN DESCRIPTION - ONSET: ONSET: AWAKENED FROM SLEEP

## 2024-11-28 ASSESSMENT — PAIN DESCRIPTION - LOCATION: LOCATION: CHEST

## 2024-11-28 NOTE — ED NOTES
Patient signed out to me pending repeat troponin and chest x-ray.  Chest x-ray shows no acute cardiopulmonary process per my interpretation.  Repeat troponin is stable and low.  Patient currently asymptomatic at this time.  Discussed having etiologies and would be contributing to his symptoms.  Recommend follow-up with his PCP if symptoms persist.    Patient stable for discharge at this time.  Patient is in agreement with the plan to be discharged at this time.  All the patient's questions were answered.  Patient was given written instructions on the diagnosis, and states understanding of the plan moving forward.  We did discuss important signs and symptoms that should prompt quick return to the emergency department.    Disposition: Patient was discharged home in stable condition.  They will follow up with their primary care physician    Prescriptions: Hydrocortisone, Lamisil, Toradol    Diagnosis: Acute chest pain and shortness of breath, no diagnosis  Agnes Dolan MD  11/28/24 0843

## 2024-11-28 NOTE — ED PROVIDER NOTES
EMERGENCY DEPARTMENT HISTORY AND PHYSICAL EXAM      Date: 11/28/2024  Patient Name: Verónica Pinzon    History of Presenting Illness     Chief Complaint   Patient presents with    Chest Pain    Shortness of Breath       History (Context): Verónica Pinzon is a 34 y.o. male  presents to the ED today with chief complaint of chest pain and dyspnea.  Patient states symptoms have been ongoing for past few days.  States symptoms are intermittent, seemingly exacerbated with exertion, chest pain described as heaviness, but otherwise nonradiating.  Patient denies any fever, chills, abdominal pain, nausea, vomiting, or diarrhea associated with his symptoms.  Denies taking any medication for treatment of his symptoms prior to arrival.      PCP: Bette Ferris MD    No current facility-administered medications for this encounter.     Current Outpatient Medications   Medication Sig Dispense Refill    ketorolac (TORADOL) 10 MG tablet Take 1 tablet by mouth every 6 hours as needed for Pain 15 tablet 0    hydrocortisone 2.5 % cream Apply topically 2 times daily Apply topically 2 times daily. 20 g 0    terbinafine (LAMISIL AT) 1 % cream Apply topically nightly Apply topically 2 times daily. 15 g 0    Erenumab-aooe 70 MG/ML SOAJ Inject 70 mg into the skin every 30 days      fluticasone (FLONASE) 50 MCG/ACT nasal spray 2 sprays by Nasal route daily      SUMAtriptan (IMITREX) 100 MG tablet Take one tab by mouth as needed for migraine. If needed may repeat the dose x 1 after 2 hours.  Do not take more than 2 pills in 24 hours.         Past History     Past Medical History:   Past Medical History:   Diagnosis Date    Constipation     Migraine     Worms in stool        Past Surgical History:  Past Surgical History:   Procedure Laterality Date    OTHER SURGICAL HISTORY      tubes placed in ears       Family History:  Family History   Problem Relation Age of Onset    Diabetes Other     Hypertension Other        Social

## 2024-11-28 NOTE — ED TRIAGE NOTES
Pt c/o chest pain and SOB since \"couple\" days.    Pt stated \"that he feels like its a weight on his chest and got worse today\".    Past Medical History:   Diagnosis Date    Constipation     Migraine     Worms in stool        Pt ambulated to the room with a steady gait.

## 2025-01-09 ENCOUNTER — OFFICE VISIT (OUTPATIENT)
Facility: CLINIC | Age: 35
End: 2025-01-09
Payer: COMMERCIAL

## 2025-01-09 ENCOUNTER — HOSPITAL ENCOUNTER (OUTPATIENT)
Facility: HOSPITAL | Age: 35
Setting detail: SPECIMEN
Discharge: HOME | End: 2025-01-12

## 2025-01-09 VITALS
BODY MASS INDEX: 30.37 KG/M2 | HEART RATE: 68 BPM | SYSTOLIC BLOOD PRESSURE: 107 MMHG | DIASTOLIC BLOOD PRESSURE: 69 MMHG | OXYGEN SATURATION: 98 % | RESPIRATION RATE: 16 BRPM | TEMPERATURE: 97.7 F | HEIGHT: 66 IN | WEIGHT: 189 LBS

## 2025-01-09 DIAGNOSIS — K59.09 CHRONIC CONSTIPATION: ICD-10-CM

## 2025-01-09 DIAGNOSIS — Z13.6 SCREENING FOR CARDIOVASCULAR CONDITION: ICD-10-CM

## 2025-01-09 DIAGNOSIS — Z87.891 HISTORY OF SMOKING: ICD-10-CM

## 2025-01-09 DIAGNOSIS — R06.02 SHORTNESS OF BREATH: Primary | ICD-10-CM

## 2025-01-09 PROCEDURE — 99214 OFFICE O/P EST MOD 30 MIN: CPT | Performed by: STUDENT IN AN ORGANIZED HEALTH CARE EDUCATION/TRAINING PROGRAM

## 2025-01-09 PROCEDURE — 99001 SPECIMEN HANDLING PT-LAB: CPT

## 2025-01-09 RX ORDER — ALBUTEROL SULFATE 90 UG/1
2 INHALANT RESPIRATORY (INHALATION) 4 TIMES DAILY PRN
Qty: 1 EACH | Refills: 0 | Status: SHIPPED | OUTPATIENT
Start: 2025-01-09

## 2025-01-09 RX ORDER — POLYETHYLENE GLYCOL 3350 17 G/17G
17 POWDER, FOR SOLUTION ORAL DAILY
Qty: 500 G | Refills: 0 | Status: SHIPPED | OUTPATIENT
Start: 2025-01-09 | End: 2025-02-08

## 2025-01-09 SDOH — ECONOMIC STABILITY: FOOD INSECURITY: WITHIN THE PAST 12 MONTHS, THE FOOD YOU BOUGHT JUST DIDN'T LAST AND YOU DIDN'T HAVE MONEY TO GET MORE.: NEVER TRUE

## 2025-01-09 SDOH — ECONOMIC STABILITY: FOOD INSECURITY: WITHIN THE PAST 12 MONTHS, YOU WORRIED THAT YOUR FOOD WOULD RUN OUT BEFORE YOU GOT MONEY TO BUY MORE.: NEVER TRUE

## 2025-01-09 ASSESSMENT — PATIENT HEALTH QUESTIONNAIRE - PHQ9
SUM OF ALL RESPONSES TO PHQ QUESTIONS 1-9: 0
2. FEELING DOWN, DEPRESSED OR HOPELESS: NOT AT ALL
1. LITTLE INTEREST OR PLEASURE IN DOING THINGS: NOT AT ALL
SUM OF ALL RESPONSES TO PHQ QUESTIONS 1-9: 0
SUM OF ALL RESPONSES TO PHQ9 QUESTIONS 1 & 2: 0

## 2025-01-09 NOTE — PROGRESS NOTES
Subjective:  Verónica is a 34 y.o. y.o. male who presents for following.       dyspnea on exertion and anterior chest pain x 2 weeks   Random chest pain, non exertional, per chart review reproducible, no recent cold   Went to ED had EKG troponin and CXR nml, and blood work nml   - symptoms have improved   - Family history of diabetes no hx heart disease  - smoked, stopped 13 years ago , 1 1/2 year 10 years  - no coughing         ROS:   General: no fever  CV:  no chest pain  Resp: no shortness of breath  AB: no  abdominal pain     Past Medical History:   Diagnosis Date    Constipation     Migraine     Worms in stool       Past Surgical History:   Procedure Laterality Date    OTHER SURGICAL HISTORY      tubes placed in ears      Social History     Socioeconomic History    Marital status: Single     Spouse name: None    Number of children: None    Years of education: None    Highest education level: None   Tobacco Use    Smoking status: Former     Current packs/day: 0.00     Types: Cigarettes     Quit date: 10/1/2011     Years since quittin.2    Smokeless tobacco: Never   Substance and Sexual Activity    Alcohol use: No    Drug use: No     Social Determinants of Health     Food Insecurity: No Food Insecurity (2025)    Hunger Vital Sign     Worried About Running Out of Food in the Last Year: Never true     Ran Out of Food in the Last Year: Never true   Transportation Needs: No Transportation Needs (2025)    PRAPARE - Transportation     Lack of Transportation (Medical): No     Lack of Transportation (Non-Medical): No   Housing Stability: Low Risk  (2025)    Housing Stability Vital Sign     Unable to Pay for Housing in the Last Year: No     Number of Times Moved in the Last Year: 0     Homeless in the Last Year: No      Current Outpatient Medications   Medication Sig Dispense Refill    polyethylene glycol (GLYCOLAX) 17 GM/SCOOP powder Take 17 g by mouth daily 500 g 0    albuterol sulfate HFA (VENTOLIN

## 2025-01-10 DIAGNOSIS — E78.5 HYPERLIPIDEMIA, UNSPECIFIED HYPERLIPIDEMIA TYPE: Primary | ICD-10-CM

## 2025-01-10 LAB
CHOLESTEROL, TOTAL: 230 MG/DL (ref 110–200)
CHOLESTEROL/HDL RATIO: 5.3 (ref 0–5)
ESTIMATED AVERAGE GLUCOSE: 74 MG/DL (ref 91–123)
HBA1C MFR BLD: 4.2 % (ref 4.8–5.6)
HDLC SERPL-MCNC: 43 MG/DL
LDL CHOLESTEROL: 177 MG/DL (ref 50–99)
LDL/HDL RATIO: 4.1
NON-HDL CHOLESTEROL: 187 MG/DL
SENTARA SPECIMEN COLLECTION: NORMAL
TRIGL SERPL-MCNC: 48 MG/DL (ref 40–149)
VLDLC SERPL CALC-MCNC: 10 MG/DL (ref 8–30)

## 2025-01-10 RX ORDER — ROSUVASTATIN CALCIUM 10 MG/1
10 TABLET, COATED ORAL NIGHTLY
Qty: 30 TABLET | Refills: 1 | Status: SHIPPED | OUTPATIENT
Start: 2025-01-10

## 2025-01-16 ENCOUNTER — HOSPITAL ENCOUNTER (OUTPATIENT)
Facility: HOSPITAL | Age: 35
Discharge: HOME | End: 2025-01-19
Payer: COMMERCIAL

## 2025-01-16 DIAGNOSIS — R06.02 SHORTNESS OF BREATH: ICD-10-CM

## 2025-01-16 DIAGNOSIS — Z87.891 HISTORY OF SMOKING: ICD-10-CM

## 2025-01-16 PROCEDURE — 94375 RESPIRATORY FLOW VOLUME LOOP: CPT

## 2025-01-16 PROCEDURE — 94726 PLETHYSMOGRAPHY LUNG VOLUMES: CPT

## 2025-01-16 PROCEDURE — 94729 DIFFUSING CAPACITY: CPT

## 2025-02-17 ENCOUNTER — OFFICE VISIT (OUTPATIENT)
Facility: CLINIC | Age: 35
End: 2025-02-17
Payer: COMMERCIAL

## 2025-02-17 VITALS
OXYGEN SATURATION: 98 % | TEMPERATURE: 97.5 F | SYSTOLIC BLOOD PRESSURE: 117 MMHG | BODY MASS INDEX: 28.96 KG/M2 | HEART RATE: 78 BPM | HEIGHT: 66 IN | DIASTOLIC BLOOD PRESSURE: 76 MMHG | WEIGHT: 180.2 LBS | RESPIRATION RATE: 12 BRPM

## 2025-02-17 DIAGNOSIS — R06.02 SHORTNESS OF BREATH: ICD-10-CM

## 2025-02-17 DIAGNOSIS — R06.09 EXERTIONAL DYSPNEA: ICD-10-CM

## 2025-02-17 DIAGNOSIS — E78.5 HYPERLIPIDEMIA, UNSPECIFIED HYPERLIPIDEMIA TYPE: Primary | ICD-10-CM

## 2025-02-17 PROCEDURE — 99214 OFFICE O/P EST MOD 30 MIN: CPT | Performed by: STUDENT IN AN ORGANIZED HEALTH CARE EDUCATION/TRAINING PROGRAM

## 2025-02-17 ASSESSMENT — PATIENT HEALTH QUESTIONNAIRE - PHQ9
SUM OF ALL RESPONSES TO PHQ QUESTIONS 1-9: 0
2. FEELING DOWN, DEPRESSED OR HOPELESS: NOT AT ALL
SUM OF ALL RESPONSES TO PHQ9 QUESTIONS 1 & 2: 0
1. LITTLE INTEREST OR PLEASURE IN DOING THINGS: NOT AT ALL

## 2025-02-17 NOTE — PROGRESS NOTES
\"Have you been to the ER, urgent care clinic since your last visit?  Hospitalized since your last visit?\"    NO    “Have you seen or consulted any other health care providers outside our system since your last visit?”    NO           
history heart disease.   -     albuterol sulfate HFA (VENTOLIN HFA) 108 (90 Base) MCG/ACT inhaler; Inhale 2 puffs into the lungs 4 times daily as needed for Wheezing or Shortness of Breath ( trial)  - referred to pulm  Referred to cardiology for stress test eval        hx leukopenia   He was referred to hematology and had workup    Preventative  Pt reports having colonoscopy in [id number] due to abdominal pain and constipation states it was negative     This note was dictated utilizing voice recognition software which may lead to typographical errors.  I apologize in advance if the situation occurs.  If questions arise please do not hesitate to contact me or call our department.    An electronic signature was used to authenticate this note.    --Andrea Silva MD

## 2025-03-07 ENCOUNTER — HOSPITAL ENCOUNTER (OUTPATIENT)
Facility: HOSPITAL | Age: 35
Setting detail: SPECIMEN
Discharge: HOME | End: 2025-03-10

## 2025-03-07 LAB
CHOLESTEROL, TOTAL: 149 MG/DL (ref 110–200)
CHOLESTEROL/HDL RATIO: 3.5 (ref 0–5)
HDLC SERPL-MCNC: 42 MG/DL
LDL CHOLESTEROL: 96 MG/DL (ref 50–99)
LDL/HDL RATIO: 2.3
NON-HDL CHOLESTEROL: 107 MG/DL
SENTARA SPECIMEN COLLECTION: NORMAL
TRIGL SERPL-MCNC: 54 MG/DL (ref 40–149)
VLDLC SERPL CALC-MCNC: 11 MG/DL (ref 8–30)

## 2025-03-07 PROCEDURE — 99001 SPECIMEN HANDLING PT-LAB: CPT

## 2025-03-08 LAB
ESTIMATED AVERAGE GLUCOSE: 79 MG/DL (ref 91–123)
HBA1C MFR BLD: 4.4 % (ref 4.8–5.6)

## 2025-03-17 ENCOUNTER — TELEPHONE (OUTPATIENT)
Facility: CLINIC | Age: 35
End: 2025-03-17

## 2025-03-17 NOTE — TELEPHONE ENCOUNTER
PHARMACY REQUESTING REFILL     LAST SOLITARIO: 2/17/25    NEXT SOLITARIO:NONE    PHARMACY REQUESTING REFILL FOR    rosuvastatin (CRESTOR) 10 MG tablet [[id number]]    PHARMACY:    [facility] [id number] - [place], [place] - [address] [place] [address] - P [phone] - F [phone]  [address] [place] [address]  Phone: [phone]  Fax: [phone]

## 2025-03-18 DIAGNOSIS — E78.5 HYPERLIPIDEMIA, UNSPECIFIED HYPERLIPIDEMIA TYPE: ICD-10-CM

## 2025-03-19 RX ORDER — ROSUVASTATIN CALCIUM 10 MG/1
10 TABLET, COATED ORAL NIGHTLY
Qty: 90 TABLET | Refills: 1 | Status: SHIPPED | OUTPATIENT
Start: 2025-03-19

## 2025-06-02 RX ORDER — CETIRIZINE HYDROCHLORIDE 5 MG/1
5 TABLET ORAL DAILY
COMMUNITY

## 2025-06-02 RX ORDER — NAPROXEN 500 MG/1
500 TABLET ORAL 2 TIMES DAILY
COMMUNITY
Start: 2024-12-02

## 2025-06-16 ENCOUNTER — OFFICE VISIT (OUTPATIENT)
Age: 35
End: 2025-06-16
Payer: COMMERCIAL

## 2025-06-16 VITALS
HEART RATE: 57 BPM | DIASTOLIC BLOOD PRESSURE: 58 MMHG | BODY MASS INDEX: 28.8 KG/M2 | TEMPERATURE: 97.4 F | WEIGHT: 179.2 LBS | RESPIRATION RATE: 18 BRPM | OXYGEN SATURATION: 98 % | SYSTOLIC BLOOD PRESSURE: 105 MMHG | HEIGHT: 66 IN

## 2025-06-16 DIAGNOSIS — Z87.891 FORMER SMOKER: ICD-10-CM

## 2025-06-16 DIAGNOSIS — J30.2 SEASONAL ALLERGIES: ICD-10-CM

## 2025-06-16 DIAGNOSIS — R06.09 DYSPNEA ON EXERTION: Primary | ICD-10-CM

## 2025-06-16 PROCEDURE — 99204 OFFICE O/P NEW MOD 45 MIN: CPT | Performed by: INTERNAL MEDICINE

## 2025-06-16 RX ORDER — BUDESONIDE AND FORMOTEROL FUMARATE DIHYDRATE 80; 4.5 UG/1; UG/1
2 AEROSOL RESPIRATORY (INHALATION) 2 TIMES DAILY
Qty: 10.2 G | Refills: 3 | Status: SHIPPED | OUTPATIENT
Start: 2025-06-16

## 2025-06-16 NOTE — PROGRESS NOTES
[place] Pinzon presents today for   Chief Complaint   Patient presents with    New Patient    Exertional Dyspena     Shortness of Breath       Is someone accompanying this pt? No    Is the patient using any DME equipment during OV? No    -DME Company No    Depression Screenin/17/2025     4:02 PM   PHQ-9 Questionaire   Little interest or pleasure in doing things 0   Feeling down, depressed, or hopeless 0   PHQ-9 Total Score 0       Learning Assessment:    Failed to redirect to the Timeline version of the [employer] Smart[employer].    Abuse Screening:         No data to display                Fall Risk    Failed to redirect to the Timeline version of the [employer] Smart[employer].    Coordination of Care:    1. Have you been to the ER, urgent care clinic since your last visit? Hospitalized since your last visit? No    2. Have you seen or consulted any other health care providers outside of the [facility] since your last visit? Include any pap smears or colon screening. No    Medication list has been update per patient.

## 2025-06-16 NOTE — PROGRESS NOTES
[facility]                                                       Pulmonary, Critical Care, and Sleep Medicine      Pulmonary Office Initial Consultation.    Name: Verónica Pinzon     : 1990     Date: 2025        Subjective:   Chief complaint: SOB  Patient is a 34 y.o. male.    HPI (25):  Today in clinic, he states that he is here to establish care.  He complains of shortness of breath.  Seen at [facility] ED in Dec, 2024 for shortness of breath and chest-wall pain.   Also seen in the ED on 24 due to shortness of breath.  He complains of intermittent chest pain and shortness of breath.  States that he gets dyspneic with activity and even at rest.  Notes dyspnea improved with rest for a few minutes.  No prior history of Asthma.  Exercise - weight lifting and Cardio.  No history of blood clots.  Admits to seasonal allergies.  Hemoptysis: none.  No wheezing. Admits to intermittent episodes of chest tightness.    Current inhalers and/or respiratory treatments:  -Albuterol rescue inhaler - using rarely; unable to tell the difference with use.    Respiratory childhood illness: none    Tobacco/Substance/Vape/Hookah: quit 13 years ago; previously smoked 1.5 ppd x ~10 years. No MJ, vape or hookah use.  Occupation: commercial plumbing; sometimes wears a mask. Notes increased exposures to dust & other particles.   Travel: none  TB / TB exposure/testing or TB treatment: no known exposures  VTE/DVT history: No   service: No  Autoimmune disease: No  COVID-19 (+) testing: (+) in 2020; not hospitalized.  COVID-19 vaccination: s/p x2 doses    Home environment:  -Pets: none. Denies history of pet allergies      Past Medical History:   Diagnosis Date    Constipation     Migraine     Worms in stool        Past Surgical History:   Procedure Laterality Date    COLONOSCOPY  2019    OTHER SURGICAL HISTORY      tubes placed in

## 2025-06-20 ENCOUNTER — OFFICE VISIT (OUTPATIENT)
Age: 35
End: 2025-06-20
Payer: COMMERCIAL

## 2025-06-20 VITALS — OXYGEN SATURATION: 99 % | HEIGHT: 66 IN | WEIGHT: 178 LBS | BODY MASS INDEX: 28.61 KG/M2

## 2025-06-20 DIAGNOSIS — R06.02 SHORTNESS OF BREATH: ICD-10-CM

## 2025-06-20 DIAGNOSIS — R07.9 CHEST PAIN, UNSPECIFIED TYPE: ICD-10-CM

## 2025-06-20 DIAGNOSIS — R06.09 EXERTIONAL DYSPNEA: Primary | ICD-10-CM

## 2025-06-20 PROCEDURE — 99204 OFFICE O/P NEW MOD 45 MIN: CPT | Performed by: INTERNAL MEDICINE

## 2025-06-20 PROCEDURE — 93000 ELECTROCARDIOGRAM COMPLETE: CPT | Performed by: INTERNAL MEDICINE

## 2025-06-20 ASSESSMENT — PATIENT HEALTH QUESTIONNAIRE - PHQ9
SUM OF ALL RESPONSES TO PHQ QUESTIONS 1-9: 0
2. FEELING DOWN, DEPRESSED OR HOPELESS: NOT AT ALL
SUM OF ALL RESPONSES TO PHQ QUESTIONS 1-9: 0
SUM OF ALL RESPONSES TO PHQ QUESTIONS 1-9: 0
1. LITTLE INTEREST OR PLEASURE IN DOING THINGS: NOT AT ALL
SUM OF ALL RESPONSES TO PHQ QUESTIONS 1-9: 0

## 2025-06-20 NOTE — PROGRESS NOTES
Verónica Pinzon    Chief Complaint   Patient presents with    New Patient     Est care due to exertional dyspnea     Shortness of Breath     SOB with exertion     Chest Pain     Left chest pressure,tightness and shooting pains on/off       HPI    Verónica Pinzon is a 34 y.o. who has seen me in the past for c/o dizziness, last seen in 2020, now referred due to SOB and chest pains. He has a very physical job and works out some times. Says Cp is very sporadic but when it happens very bad and feels pressure/ tight.     The SOB is the main concern, he recently saw pulm, says gave him an inhaler.      Past Medical History:   Diagnosis Date    Constipation     Exertional dyspnea     Migraine     Worms in stool        Past Surgical History:   Procedure Laterality Date    COLONOSCOPY  2019    OTHER SURGICAL HISTORY      tubes placed in ears       Current Outpatient Medications   Medication Sig Dispense Refill    diphenhydrAMINE HCl (BENADRYL ALLERGY PO) Take by mouth nightly      rosuvastatin (CRESTOR) 10 MG tablet Take 1 tablet by mouth nightly 90 tablet 1    albuterol sulfate HFA (VENTOLIN HFA) 108 (90 Base) MCG/ACT inhaler Inhale 2 puffs into the lungs 4 times daily as needed for Wheezing or Shortness of Breath 1 each 0    hydrocortisone 2.5 % cream Apply topically 2 times daily Apply topically 2 times daily. (Patient taking differently: Apply topically as needed Apply topically 2 times daily.) 20 g 0    budesonide-formoterol (SYMBICORT) 80-4.5 MCG/ACT AERO Inhale 2 puffs into the lungs 2 times daily (Patient not taking: Reported on 6/20/2025) 10.2 g 3    cetirizine (ZYRTEC) 5 MG tablet Take 1 tablet by mouth daily (Patient not taking: Reported on 6/20/2025)      naproxen (NAPROSYN) 500 MG tablet Take 1 tablet by mouth 2 times daily (Patient not taking: Reported on 6/20/2025)      ketorolac (TORADOL) 10 MG tablet Take 1 tablet by mouth every 6 hours as needed for Pain (Patient not taking: Reported on 1/9/2025) 15

## 2025-06-20 NOTE — PROGRESS NOTES
Verónica Pinzon presents today for   Chief Complaint   Patient presents with    New Patient     Est care due to exertional dyspnea     Shortness of Breath     SOB with exertion     Chest Pain     Left chest pressure,tightness and shooting pains on/off       Verónica Pinzon preferred language for health care discussion is english/other.    Is someone accompanying this pt? no    Is the patient using any DME equipment during OV? no    Depression Screening:  Depression: Not at risk (6/20/2025)    PHQ-2     PHQ-2 Score: 0        Learning Assessment:  Who is the primary learner? Patient    What is the preferred language for health care of the primary learner? ENGLISH    How does the primary learner prefer to learn new concepts? DEMONSTRATION    Answered By patient    Relationship to Learner SELF           Pt currently taking Anticoagulant therapy? no    Pt currently taking Antiplatelet therapy ? no      Coordination of Care:  1. Have you been to the ER, urgent care clinic since your last visit? Hospitalized since your last visit? no    2. Have you seen or consulted any other health care providers outside of the [facility] since your last visit? Include any pap smears or colon screening. no